# Patient Record
Sex: FEMALE | Race: WHITE | NOT HISPANIC OR LATINO | Employment: FULL TIME | URBAN - METROPOLITAN AREA
[De-identification: names, ages, dates, MRNs, and addresses within clinical notes are randomized per-mention and may not be internally consistent; named-entity substitution may affect disease eponyms.]

---

## 2017-04-11 ENCOUNTER — OFFICE VISIT - HEALTHEAST (OUTPATIENT)
Dept: FAMILY MEDICINE | Facility: CLINIC | Age: 42
End: 2017-04-11

## 2017-04-11 DIAGNOSIS — J98.01 BRONCHOSPASM: ICD-10-CM

## 2017-04-11 DIAGNOSIS — K21.9 GERD (GASTROESOPHAGEAL REFLUX DISEASE): ICD-10-CM

## 2017-04-11 ASSESSMENT — MIFFLIN-ST. JEOR: SCORE: 1258.43

## 2017-09-16 ENCOUNTER — COMMUNICATION - HEALTHEAST (OUTPATIENT)
Dept: FAMILY MEDICINE | Facility: CLINIC | Age: 42
End: 2017-09-16

## 2017-10-14 ENCOUNTER — COMMUNICATION - HEALTHEAST (OUTPATIENT)
Dept: FAMILY MEDICINE | Facility: CLINIC | Age: 42
End: 2017-10-14

## 2017-10-14 DIAGNOSIS — F41.1 GENERALIZED ANXIETY DISORDER: ICD-10-CM

## 2017-10-14 DIAGNOSIS — F33.9 MAJOR DEPRESSIVE DISORDER, RECURRENT EPISODE (H): ICD-10-CM

## 2017-11-13 ENCOUNTER — OFFICE VISIT - HEALTHEAST (OUTPATIENT)
Dept: FAMILY MEDICINE | Facility: CLINIC | Age: 42
End: 2017-11-13

## 2017-11-13 DIAGNOSIS — F41.1 GENERALIZED ANXIETY DISORDER: ICD-10-CM

## 2017-11-13 DIAGNOSIS — F33.9 MAJOR DEPRESSIVE DISORDER, RECURRENT EPISODE (H): ICD-10-CM

## 2017-11-13 DIAGNOSIS — Z13.220 SCREENING FOR CHOLESTEROL LEVEL: ICD-10-CM

## 2017-11-13 ASSESSMENT — MIFFLIN-ST. JEOR: SCORE: 1276.57

## 2017-11-15 ENCOUNTER — COMMUNICATION - HEALTHEAST (OUTPATIENT)
Dept: FAMILY MEDICINE | Facility: CLINIC | Age: 42
End: 2017-11-15

## 2017-11-16 ENCOUNTER — COMMUNICATION - HEALTHEAST (OUTPATIENT)
Dept: FAMILY MEDICINE | Facility: CLINIC | Age: 42
End: 2017-11-16

## 2017-11-16 ENCOUNTER — RECORDS - HEALTHEAST (OUTPATIENT)
Dept: ADMINISTRATIVE | Facility: OTHER | Age: 42
End: 2017-11-16

## 2017-11-20 ENCOUNTER — COMMUNICATION - HEALTHEAST (OUTPATIENT)
Dept: FAMILY MEDICINE | Facility: CLINIC | Age: 42
End: 2017-11-20

## 2017-11-27 ENCOUNTER — COMMUNICATION - HEALTHEAST (OUTPATIENT)
Dept: FAMILY MEDICINE | Facility: CLINIC | Age: 42
End: 2017-11-27

## 2017-12-08 ENCOUNTER — COMMUNICATION - HEALTHEAST (OUTPATIENT)
Dept: FAMILY MEDICINE | Facility: CLINIC | Age: 42
End: 2017-12-08

## 2018-01-31 ENCOUNTER — COMMUNICATION - HEALTHEAST (OUTPATIENT)
Dept: FAMILY MEDICINE | Facility: CLINIC | Age: 43
End: 2018-01-31

## 2018-01-31 ENCOUNTER — COMMUNICATION - HEALTHEAST (OUTPATIENT)
Dept: SCHEDULING | Facility: CLINIC | Age: 43
End: 2018-01-31

## 2018-04-23 ENCOUNTER — COMMUNICATION - HEALTHEAST (OUTPATIENT)
Dept: FAMILY MEDICINE | Facility: CLINIC | Age: 43
End: 2018-04-23

## 2018-04-23 ENCOUNTER — OFFICE VISIT - HEALTHEAST (OUTPATIENT)
Dept: BEHAVIORAL HEALTH | Facility: CLINIC | Age: 43
End: 2018-04-23

## 2018-04-23 DIAGNOSIS — F41.1 GENERALIZED ANXIETY DISORDER: ICD-10-CM

## 2018-04-23 DIAGNOSIS — F33.9 MAJOR DEPRESSIVE DISORDER, RECURRENT EPISODE (H): ICD-10-CM

## 2018-04-23 DIAGNOSIS — F33.1 MAJOR DEPRESSIVE DISORDER, RECURRENT, MODERATE (H): ICD-10-CM

## 2018-05-21 ENCOUNTER — OFFICE VISIT - HEALTHEAST (OUTPATIENT)
Dept: BEHAVIORAL HEALTH | Facility: CLINIC | Age: 43
End: 2018-05-21

## 2018-05-21 DIAGNOSIS — F33.1 MAJOR DEPRESSIVE DISORDER, RECURRENT, MODERATE (H): ICD-10-CM

## 2018-05-21 DIAGNOSIS — F41.1 GENERALIZED ANXIETY DISORDER: ICD-10-CM

## 2018-06-04 ENCOUNTER — OFFICE VISIT - HEALTHEAST (OUTPATIENT)
Dept: BEHAVIORAL HEALTH | Facility: CLINIC | Age: 43
End: 2018-06-04

## 2018-06-04 ENCOUNTER — AMBULATORY - HEALTHEAST (OUTPATIENT)
Dept: BEHAVIORAL HEALTH | Facility: CLINIC | Age: 43
End: 2018-06-04

## 2018-06-04 DIAGNOSIS — F41.1 GENERALIZED ANXIETY DISORDER: ICD-10-CM

## 2018-06-04 DIAGNOSIS — F33.1 MAJOR DEPRESSIVE DISORDER, RECURRENT, MODERATE (H): ICD-10-CM

## 2018-07-25 ENCOUNTER — AMBULATORY - HEALTHEAST (OUTPATIENT)
Dept: BEHAVIORAL HEALTH | Facility: CLINIC | Age: 43
End: 2018-07-25

## 2018-08-01 ENCOUNTER — OFFICE VISIT - HEALTHEAST (OUTPATIENT)
Dept: BEHAVIORAL HEALTH | Facility: CLINIC | Age: 43
End: 2018-08-01

## 2018-08-01 DIAGNOSIS — F41.1 GENERALIZED ANXIETY DISORDER: ICD-10-CM

## 2018-08-01 DIAGNOSIS — F33.1 MAJOR DEPRESSIVE DISORDER, RECURRENT, MODERATE (H): ICD-10-CM

## 2019-02-19 ENCOUNTER — OFFICE VISIT - HEALTHEAST (OUTPATIENT)
Dept: FAMILY MEDICINE | Facility: CLINIC | Age: 44
End: 2019-02-19

## 2019-02-19 DIAGNOSIS — E55.9 VITAMIN D DEFICIENCY: ICD-10-CM

## 2019-02-19 DIAGNOSIS — R76.8 ANTI-TPO ANTIBODIES PRESENT: ICD-10-CM

## 2019-02-19 DIAGNOSIS — F41.1 GENERALIZED ANXIETY DISORDER: ICD-10-CM

## 2019-02-19 DIAGNOSIS — F33.9 MAJOR DEPRESSIVE DISORDER, RECURRENT EPISODE (H): ICD-10-CM

## 2019-02-19 DIAGNOSIS — Z12.31 VISIT FOR SCREENING MAMMOGRAM: ICD-10-CM

## 2019-02-19 DIAGNOSIS — E53.8 VITAMIN B12 DEFICIENCY (NON ANEMIC): ICD-10-CM

## 2019-02-19 LAB
T3 SERPL-MCNC: 75 NG/DL (ref 45–175)
T4 FREE SERPL-MCNC: 0.9 NG/DL (ref 0.7–1.8)
THYROID PEROXIDASE ANTIBODIES - HISTORICAL: 76.2 IU/ML (ref 0–5.6)
TSH SERPL DL<=0.005 MIU/L-ACNC: 2.02 UIU/ML (ref 0.3–5)
VIT B12 SERPL-MCNC: 263 PG/ML (ref 213–816)

## 2019-02-20 LAB — 25(OH)D3 SERPL-MCNC: 10.8 NG/ML (ref 30–80)

## 2019-02-21 ENCOUNTER — COMMUNICATION - HEALTHEAST (OUTPATIENT)
Dept: FAMILY MEDICINE | Facility: CLINIC | Age: 44
End: 2019-02-21

## 2019-03-05 ENCOUNTER — HOSPITAL ENCOUNTER (OUTPATIENT)
Dept: MAMMOGRAPHY | Facility: CLINIC | Age: 44
Discharge: HOME | End: 2019-03-05
Attending: FAMILY MEDICINE

## 2019-03-05 DIAGNOSIS — Z12.31 VISIT FOR SCREENING MAMMOGRAM: ICD-10-CM

## 2019-04-01 ENCOUNTER — AMBULATORY - HEALTHEAST (OUTPATIENT)
Dept: BEHAVIORAL HEALTH | Facility: CLINIC | Age: 44
End: 2019-04-01

## 2019-04-01 ENCOUNTER — OFFICE VISIT - HEALTHEAST (OUTPATIENT)
Dept: BEHAVIORAL HEALTH | Facility: CLINIC | Age: 44
End: 2019-04-01

## 2019-04-01 DIAGNOSIS — F41.1 GENERALIZED ANXIETY DISORDER: ICD-10-CM

## 2019-04-01 DIAGNOSIS — F33.0 MAJOR DEPRESSIVE DISORDER, RECURRENT EPISODE, MILD (H): ICD-10-CM

## 2019-08-21 ENCOUNTER — OFFICE VISIT - HEALTHEAST (OUTPATIENT)
Dept: FAMILY MEDICINE | Facility: CLINIC | Age: 44
End: 2019-08-21

## 2019-08-21 DIAGNOSIS — E53.8 VITAMIN B12 DEFICIENCY (NON ANEMIC): ICD-10-CM

## 2019-08-21 DIAGNOSIS — R42 VERTIGO: ICD-10-CM

## 2019-08-21 DIAGNOSIS — Z13.1 SCREENING FOR DIABETES MELLITUS: ICD-10-CM

## 2019-08-21 DIAGNOSIS — Z13.220 SCREENING FOR CHOLESTEROL LEVEL: ICD-10-CM

## 2019-08-21 DIAGNOSIS — Z12.4 SCREENING FOR CERVICAL CANCER: ICD-10-CM

## 2019-08-21 DIAGNOSIS — R42 LIGHTHEADEDNESS: ICD-10-CM

## 2019-08-21 DIAGNOSIS — Z00.00 ROUTINE GENERAL MEDICAL EXAMINATION AT A HEALTH CARE FACILITY: ICD-10-CM

## 2019-08-21 DIAGNOSIS — H91.93 BILATERAL HEARING LOSS, UNSPECIFIED HEARING LOSS TYPE: ICD-10-CM

## 2019-08-21 DIAGNOSIS — E55.9 VITAMIN D DEFICIENCY: ICD-10-CM

## 2019-08-21 LAB
ALBUMIN SERPL-MCNC: 3.9 G/DL (ref 3.5–5)
ALP SERPL-CCNC: 113 U/L (ref 45–120)
ALT SERPL W P-5'-P-CCNC: 22 U/L (ref 0–45)
ANION GAP SERPL CALCULATED.3IONS-SCNC: 7 MMOL/L (ref 5–18)
AST SERPL W P-5'-P-CCNC: 18 U/L (ref 0–40)
BILIRUB SERPL-MCNC: 0.5 MG/DL (ref 0–1)
BUN SERPL-MCNC: 16 MG/DL (ref 8–22)
CALCIUM SERPL-MCNC: 9.1 MG/DL (ref 8.5–10.5)
CHLORIDE BLD-SCNC: 111 MMOL/L (ref 98–107)
CHOLEST SERPL-MCNC: 184 MG/DL
CO2 SERPL-SCNC: 19 MMOL/L (ref 22–31)
CREAT SERPL-MCNC: 0.88 MG/DL (ref 0.6–1.1)
ERYTHROCYTE [DISTWIDTH] IN BLOOD BY AUTOMATED COUNT: 14.5 % (ref 11–14.5)
FASTING STATUS PATIENT QL REPORTED: YES
GFR SERPL CREATININE-BSD FRML MDRD: >60 ML/MIN/1.73M2
GLUCOSE BLD-MCNC: 88 MG/DL (ref 70–125)
HCT VFR BLD AUTO: 38.7 % (ref 35–47)
HDLC SERPL-MCNC: 46 MG/DL
HGB BLD-MCNC: 12.7 G/DL (ref 12–16)
LDLC SERPL CALC-MCNC: 121 MG/DL
MCH RBC QN AUTO: 28.5 PG (ref 27–34)
MCHC RBC AUTO-ENTMCNC: 32.8 G/DL (ref 32–36)
MCV RBC AUTO: 87 FL (ref 80–100)
PLATELET # BLD AUTO: 224 THOU/UL (ref 140–440)
PMV BLD AUTO: 11.4 FL (ref 8.5–12.5)
POTASSIUM BLD-SCNC: 3.6 MMOL/L (ref 3.5–5)
PROT SERPL-MCNC: 8 G/DL (ref 6–8)
RBC # BLD AUTO: 4.46 MILL/UL (ref 3.8–5.4)
SODIUM SERPL-SCNC: 137 MMOL/L (ref 136–145)
TRIGL SERPL-MCNC: 87 MG/DL
WBC: 6.1 THOU/UL (ref 4–11)

## 2019-08-21 ASSESSMENT — MIFFLIN-ST. JEOR: SCORE: 1303.79

## 2019-08-22 ENCOUNTER — AMBULATORY - HEALTHEAST (OUTPATIENT)
Dept: FAMILY MEDICINE | Facility: CLINIC | Age: 44
End: 2019-08-22

## 2019-08-22 DIAGNOSIS — E55.9 VITAMIN D DEFICIENCY: ICD-10-CM

## 2019-08-22 LAB
25(OH)D3 SERPL-MCNC: 17.3 NG/ML (ref 30–80)
25(OH)D3 SERPL-MCNC: 17.3 NG/ML (ref 30–80)
HPV SOURCE: NORMAL
HUMAN PAPILLOMA VIRUS 16 DNA: NEGATIVE
HUMAN PAPILLOMA VIRUS 18 DNA: NEGATIVE
HUMAN PAPILLOMA VIRUS FINAL DIAGNOSIS: NORMAL
HUMAN PAPILLOMA VIRUS OTHER HR: NEGATIVE
SPECIMEN DESCRIPTION: NORMAL

## 2019-08-28 LAB
BKR LAB AP ABNORMAL BLEEDING: NORMAL
BKR LAB AP BIRTH CONTROL/HORMONES: NORMAL
BKR LAB AP CERVICAL APPEARANCE: NORMAL
BKR LAB AP GYN ADEQUACY: NORMAL
BKR LAB AP GYN INTERPRETATION: NORMAL
BKR LAB AP HPV REFLEX: NORMAL
BKR LAB AP LMP: NORMAL
BKR LAB AP PATIENT STATUS: NORMAL
BKR LAB AP PREVIOUS ABNORMAL: NORMAL
BKR LAB AP PREVIOUS NORMAL: NORMAL
HIGH RISK?: NO
PATH REPORT.COMMENTS IMP SPEC: NORMAL
RESULT FLAG (HE HISTORICAL CONVERSION): NORMAL

## 2019-09-11 ENCOUNTER — OFFICE VISIT - HEALTHEAST (OUTPATIENT)
Dept: OTOLARYNGOLOGY | Facility: CLINIC | Age: 44
End: 2019-09-11

## 2019-09-11 ENCOUNTER — OFFICE VISIT - HEALTHEAST (OUTPATIENT)
Dept: AUDIOLOGY | Facility: CLINIC | Age: 44
End: 2019-09-11

## 2019-09-11 DIAGNOSIS — H93.8X3 PLUGGED FEELING IN EAR, BILATERAL: ICD-10-CM

## 2019-09-11 DIAGNOSIS — R42 DIZZINESS: ICD-10-CM

## 2019-09-11 DIAGNOSIS — R42 DIZZINESS AND GIDDINESS: ICD-10-CM

## 2019-09-13 ENCOUNTER — COMMUNICATION - HEALTHEAST (OUTPATIENT)
Dept: OTOLARYNGOLOGY | Facility: CLINIC | Age: 44
End: 2019-09-13

## 2019-10-14 ENCOUNTER — AMBULATORY - HEALTHEAST (OUTPATIENT)
Dept: BEHAVIORAL HEALTH | Facility: CLINIC | Age: 44
End: 2019-10-14

## 2019-10-30 ENCOUNTER — OFFICE VISIT - HEALTHEAST (OUTPATIENT)
Dept: OTOLARYNGOLOGY | Facility: CLINIC | Age: 44
End: 2019-10-30

## 2019-10-30 DIAGNOSIS — R42 DIZZINESS: ICD-10-CM

## 2020-02-14 ENCOUNTER — OFFICE VISIT - HEALTHEAST (OUTPATIENT)
Dept: FAMILY MEDICINE | Facility: CLINIC | Age: 45
End: 2020-02-14

## 2020-02-14 DIAGNOSIS — F33.9 MAJOR DEPRESSIVE DISORDER, RECURRENT EPISODE (H): ICD-10-CM

## 2020-02-14 DIAGNOSIS — F41.1 GENERALIZED ANXIETY DISORDER: ICD-10-CM

## 2020-02-14 ASSESSMENT — PATIENT HEALTH QUESTIONNAIRE - PHQ9: SUM OF ALL RESPONSES TO PHQ QUESTIONS 1-9: 3

## 2020-02-14 ASSESSMENT — ANXIETY QUESTIONNAIRES
2. NOT BEING ABLE TO STOP OR CONTROL WORRYING: NOT AT ALL
IF YOU CHECKED OFF ANY PROBLEMS ON THIS QUESTIONNAIRE, HOW DIFFICULT HAVE THESE PROBLEMS MADE IT FOR YOU TO DO YOUR WORK, TAKE CARE OF THINGS AT HOME, OR GET ALONG WITH OTHER PEOPLE: SOMEWHAT DIFFICULT
1. FEELING NERVOUS, ANXIOUS, OR ON EDGE: SEVERAL DAYS
GAD7 TOTAL SCORE: 2
7. FEELING AFRAID AS IF SOMETHING AWFUL MIGHT HAPPEN: NOT AT ALL
4. TROUBLE RELAXING: NOT AT ALL
5. BEING SO RESTLESS THAT IT IS HARD TO SIT STILL: NOT AT ALL
6. BECOMING EASILY ANNOYED OR IRRITABLE: NOT AT ALL
3. WORRYING TOO MUCH ABOUT DIFFERENT THINGS: SEVERAL DAYS

## 2020-04-07 ENCOUNTER — COMMUNICATION - HEALTHEAST (OUTPATIENT)
Dept: FAMILY MEDICINE | Facility: CLINIC | Age: 45
End: 2020-04-07

## 2020-10-12 ENCOUNTER — OFFICE VISIT - HEALTHEAST (OUTPATIENT)
Dept: FAMILY MEDICINE | Facility: CLINIC | Age: 45
End: 2020-10-12

## 2020-10-12 DIAGNOSIS — M62.81 GENERALIZED MUSCLE WEAKNESS: ICD-10-CM

## 2020-10-14 ENCOUNTER — COMMUNICATION - HEALTHEAST (OUTPATIENT)
Dept: SCHEDULING | Facility: CLINIC | Age: 45
End: 2020-10-14

## 2020-10-19 ENCOUNTER — AMBULATORY - HEALTHEAST (OUTPATIENT)
Dept: FAMILY MEDICINE | Facility: CLINIC | Age: 45
End: 2020-10-19

## 2020-10-19 DIAGNOSIS — R76.8 ELEVATED ANTINUCLEAR ANTIBODY (ANA) LEVEL: ICD-10-CM

## 2020-10-20 ENCOUNTER — OFFICE VISIT - HEALTHEAST (OUTPATIENT)
Dept: FAMILY MEDICINE | Facility: CLINIC | Age: 45
End: 2020-10-20

## 2020-10-20 ENCOUNTER — COMMUNICATION - HEALTHEAST (OUTPATIENT)
Dept: FAMILY MEDICINE | Facility: CLINIC | Age: 45
End: 2020-10-20

## 2020-10-20 DIAGNOSIS — M62.81 GENERALIZED MUSCLE WEAKNESS: ICD-10-CM

## 2020-10-20 DIAGNOSIS — Z86.39 HISTORY OF HYPOKALEMIA: ICD-10-CM

## 2020-10-20 DIAGNOSIS — R74.8 ELEVATED CK: ICD-10-CM

## 2020-10-20 DIAGNOSIS — R76.8 ANA POSITIVE: ICD-10-CM

## 2020-10-20 LAB
ANION GAP SERPL CALCULATED.3IONS-SCNC: 7 MMOL/L (ref 5–18)
BUN SERPL-MCNC: 16 MG/DL (ref 8–22)
CALCIUM SERPL-MCNC: 9.4 MG/DL (ref 8.5–10.5)
CHLORIDE BLD-SCNC: 108 MMOL/L (ref 98–107)
CK SERPL-CCNC: 26 U/L (ref 30–190)
CO2 SERPL-SCNC: 22 MMOL/L (ref 22–31)
CREAT SERPL-MCNC: 0.83 MG/DL (ref 0.6–1.1)
GFR SERPL CREATININE-BSD FRML MDRD: >60 ML/MIN/1.73M2
GLUCOSE BLD-MCNC: 81 MG/DL (ref 70–125)
POTASSIUM BLD-SCNC: 4.6 MMOL/L (ref 3.5–5)
SODIUM SERPL-SCNC: 137 MMOL/L (ref 136–145)

## 2020-10-20 ASSESSMENT — PATIENT HEALTH QUESTIONNAIRE - PHQ9: SUM OF ALL RESPONSES TO PHQ QUESTIONS 1-9: 6

## 2020-11-10 ENCOUNTER — COMMUNICATION - HEALTHEAST (OUTPATIENT)
Dept: FAMILY MEDICINE | Facility: CLINIC | Age: 45
End: 2020-11-10

## 2020-11-19 ENCOUNTER — COMMUNICATION - HEALTHEAST (OUTPATIENT)
Dept: FAMILY MEDICINE | Facility: CLINIC | Age: 45
End: 2020-11-19

## 2020-11-19 DIAGNOSIS — E87.6 HYPOKALEMIA: ICD-10-CM

## 2020-11-24 ENCOUNTER — OFFICE VISIT - HEALTHEAST (OUTPATIENT)
Dept: RHEUMATOLOGY | Facility: CLINIC | Age: 45
End: 2020-11-24

## 2020-11-24 ENCOUNTER — COMMUNICATION - HEALTHEAST (OUTPATIENT)
Dept: FAMILY MEDICINE | Facility: CLINIC | Age: 45
End: 2020-11-24

## 2020-11-24 DIAGNOSIS — M35.04 SJOGREN'S SYNDROME WITH TUBULO-INTERSTITIAL NEPHROPATHY (H): ICD-10-CM

## 2020-11-24 DIAGNOSIS — N25.89 TYPE II RTA: ICD-10-CM

## 2020-11-25 ENCOUNTER — COMMUNICATION - HEALTHEAST (OUTPATIENT)
Dept: RHEUMATOLOGY | Facility: CLINIC | Age: 45
End: 2020-11-25

## 2020-12-01 ENCOUNTER — COMMUNICATION - HEALTHEAST (OUTPATIENT)
Dept: RHEUMATOLOGY | Facility: CLINIC | Age: 45
End: 2020-12-01

## 2020-12-02 ENCOUNTER — COMMUNICATION - HEALTHEAST (OUTPATIENT)
Dept: ADMINISTRATIVE | Facility: CLINIC | Age: 45
End: 2020-12-02

## 2020-12-07 LAB
CREATININE (EXTERNAL): 0.92 MG/DL (ref 0.55–1.02)
GFR ESTIMATED (EXTERNAL): >60 ML/MIN
GFR ESTIMATED (IF AFRICAN AMERICAN) (EXTERNAL): >60 ML/MIN
GLUCOSE (EXTERNAL): 81 MG/DL (ref 74–106)
POTASSIUM (EXTERNAL): 4 MMOL/L (ref 3.5–5.1)

## 2020-12-09 ENCOUNTER — COMMUNICATION - HEALTHEAST (OUTPATIENT)
Dept: RHEUMATOLOGY | Facility: CLINIC | Age: 45
End: 2020-12-09

## 2020-12-15 ENCOUNTER — AMBULATORY - HEALTHEAST (OUTPATIENT)
Dept: LAB | Facility: CLINIC | Age: 45
End: 2020-12-15

## 2020-12-15 DIAGNOSIS — M35.04 SJOGREN'S SYNDROME WITH TUBULO-INTERSTITIAL NEPHROPATHY (H): ICD-10-CM

## 2020-12-15 DIAGNOSIS — N25.89 TYPE II RTA: ICD-10-CM

## 2020-12-15 LAB
ALBUMIN SERPL-MCNC: 4 G/DL (ref 3.5–5)
ALBUMIN UR-MCNC: ABNORMAL MG/DL
ALP SERPL-CCNC: 110 U/L (ref 45–120)
ALT SERPL W P-5'-P-CCNC: 17 U/L (ref 0–45)
ANION GAP SERPL CALCULATED.3IONS-SCNC: 8 MMOL/L (ref 5–18)
APPEARANCE UR: CLEAR
AST SERPL W P-5'-P-CCNC: 15 U/L (ref 0–40)
BACTERIA #/AREA URNS HPF: ABNORMAL HPF
BASOPHILS # BLD AUTO: 0 THOU/UL (ref 0–0.2)
BASOPHILS NFR BLD AUTO: 0 % (ref 0–2)
BILIRUB SERPL-MCNC: 0.3 MG/DL (ref 0–1)
BILIRUB UR QL STRIP: NEGATIVE
BUN SERPL-MCNC: 16 MG/DL (ref 8–22)
C REACTIVE PROTEIN LHE: 0.2 MG/DL (ref 0–0.8)
C3 SERPL-MCNC: 127 MG/DL (ref 83–177)
C4 SERPL-MCNC: 21 MG/DL (ref 19–59)
CALCIUM SERPL-MCNC: 9.1 MG/DL (ref 8.5–10.5)
CCP AB SER IA-ACNC: <0.5 U/ML
CHLORIDE BLD-SCNC: 113 MMOL/L (ref 98–107)
CK SERPL-CCNC: 27 U/L (ref 30–190)
CO2 SERPL-SCNC: 19 MMOL/L (ref 22–31)
COLOR UR AUTO: YELLOW
CREAT SERPL-MCNC: 0.91 MG/DL (ref 0.6–1.1)
CREAT UR-MCNC: 118.1 MG/DL
EOSINOPHIL # BLD AUTO: 0.1 THOU/UL (ref 0–0.4)
EOSINOPHIL NFR BLD AUTO: 1 % (ref 0–6)
ERYTHROCYTE [DISTWIDTH] IN BLOOD BY AUTOMATED COUNT: 15 % (ref 11–14.5)
ERYTHROCYTE [SEDIMENTATION RATE] IN BLOOD BY WESTERGREN METHOD: 36 MM/HR (ref 0–20)
GFR SERPL CREATININE-BSD FRML MDRD: >60 ML/MIN/1.73M2
GLUCOSE BLD-MCNC: 82 MG/DL (ref 70–125)
GLUCOSE UR STRIP-MCNC: NEGATIVE MG/DL
HCT VFR BLD AUTO: 39.9 % (ref 35–47)
HGB BLD-MCNC: 12.7 G/DL (ref 12–16)
HGB UR QL STRIP: NEGATIVE
IMM GRANULOCYTES # BLD: 0 THOU/UL
IMM GRANULOCYTES NFR BLD: 0 %
KETONES UR STRIP-MCNC: NEGATIVE MG/DL
LEUKOCYTE ESTERASE UR QL STRIP: NEGATIVE
LYMPHOCYTES # BLD AUTO: 1.4 THOU/UL (ref 0.8–4.4)
LYMPHOCYTES NFR BLD AUTO: 26 % (ref 20–40)
MCH RBC QN AUTO: 27.8 PG (ref 27–34)
MCHC RBC AUTO-ENTMCNC: 31.8 G/DL (ref 32–36)
MCV RBC AUTO: 87 FL (ref 80–100)
MICROALBUMIN UR-MCNC: 21.35 MG/DL (ref 0–1.99)
MICROALBUMIN/CREAT UR: 180.8 MG/G
MONOCYTES # BLD AUTO: 0.5 THOU/UL (ref 0–0.9)
MONOCYTES NFR BLD AUTO: 9 % (ref 2–10)
NEUTROPHILS # BLD AUTO: 3.6 THOU/UL (ref 2–7.7)
NEUTROPHILS NFR BLD AUTO: 64 % (ref 50–70)
NITRATE UR QL: NEGATIVE
PH UR STRIP: 7 [PH] (ref 5–8)
PLATELET # BLD AUTO: 194 THOU/UL (ref 140–440)
PMV BLD AUTO: 12.1 FL (ref 8.5–12.5)
POTASSIUM BLD-SCNC: 3.9 MMOL/L (ref 3.5–5)
PROT SERPL-MCNC: 8.3 G/DL (ref 6–8)
RBC # BLD AUTO: 4.57 MILL/UL (ref 3.8–5.4)
RBC #/AREA URNS AUTO: ABNORMAL HPF
SODIUM SERPL-SCNC: 140 MMOL/L (ref 136–145)
SP GR UR STRIP: 1.02 (ref 1–1.03)
SQUAMOUS #/AREA URNS AUTO: ABNORMAL LPF
UROBILINOGEN UR STRIP-ACNC: ABNORMAL
WBC #/AREA URNS AUTO: ABNORMAL HPF
WBC: 5.7 THOU/UL (ref 4–11)

## 2020-12-16 LAB
HBV SURFACE AG SERPL QL IA: NEGATIVE
HCV AB SERPL QL IA: NEGATIVE

## 2020-12-17 LAB
CARDIOLIPIN IGA SER IA-ACNC: 20.1 APL U/ML (ref 0–19.9)
CARDIOLIPIN IGG SER IA-ACNC: 3.4 GPL-U/ML (ref 0–19.9)
CARDIOLIPIN IGM SER IA-ACNC: 19.7 MPL-U/ML (ref 0–19.9)
TPMT BLD-CCNC: 26.3 U/ML (ref 24–44)

## 2020-12-18 LAB
GAMMA INTERFERON BACKGROUND BLD IA-ACNC: 0.04 IU/ML
LA PPP-IMP: NEGATIVE
M TB IFN-G BLD-IMP: NEGATIVE
MITOGEN IGNF BCKGRD COR BLD-ACNC: -0.01 IU/ML
MITOGEN IGNF BCKGRD COR BLD-ACNC: 0 IU/ML
QTF INTERPRETATION: NORMAL
QTF MITOGEN - NIL: >10 IU/ML

## 2020-12-28 ENCOUNTER — COMMUNICATION - HEALTHEAST (OUTPATIENT)
Dept: RHEUMATOLOGY | Facility: CLINIC | Age: 45
End: 2020-12-28

## 2020-12-28 DIAGNOSIS — N25.89 TYPE II RTA: ICD-10-CM

## 2021-01-05 ENCOUNTER — OFFICE VISIT - HEALTHEAST (OUTPATIENT)
Dept: RHEUMATOLOGY | Facility: CLINIC | Age: 46
End: 2021-01-05

## 2021-01-05 ENCOUNTER — COMMUNICATION - HEALTHEAST (OUTPATIENT)
Dept: RHEUMATOLOGY | Facility: CLINIC | Age: 46
End: 2021-01-05

## 2021-01-05 DIAGNOSIS — M35.04 SJOGREN'S SYNDROME WITH TUBULO-INTERSTITIAL NEPHROPATHY (H): ICD-10-CM

## 2021-01-05 DIAGNOSIS — N25.89 TYPE II RTA: ICD-10-CM

## 2021-01-05 RX ORDER — CAFFEINE 200 MG
200 TABLET ORAL
Status: SHIPPED | COMMUNITY
Start: 2021-01-05

## 2021-01-11 ENCOUNTER — OFFICE VISIT - HEALTHEAST (OUTPATIENT)
Dept: RHEUMATOLOGY | Facility: CLINIC | Age: 46
End: 2021-01-11

## 2021-01-11 DIAGNOSIS — N25.89 TYPE II RTA: ICD-10-CM

## 2021-01-11 DIAGNOSIS — M35.04 SJOGREN'S SYNDROME WITH TUBULO-INTERSTITIAL NEPHROPATHY (H): ICD-10-CM

## 2021-01-11 DIAGNOSIS — R76.0 ANTICARDIOLIPIN ANTIBODY POSITIVE: ICD-10-CM

## 2021-01-11 DIAGNOSIS — M19.90 INFLAMMATORY ARTHRITIS: ICD-10-CM

## 2021-01-11 DIAGNOSIS — M79.642 PAIN IN BOTH HANDS: ICD-10-CM

## 2021-01-11 DIAGNOSIS — R77.8 ELEVATED TOTAL PROTEIN: ICD-10-CM

## 2021-01-11 DIAGNOSIS — M25.551 RIGHT HIP PAIN: ICD-10-CM

## 2021-01-11 DIAGNOSIS — M79.641 PAIN IN BOTH HANDS: ICD-10-CM

## 2021-01-11 ASSESSMENT — MIFFLIN-ST. JEOR: SCORE: 1245.5

## 2021-01-20 ENCOUNTER — COMMUNICATION - HEALTHEAST (OUTPATIENT)
Dept: FAMILY MEDICINE | Facility: CLINIC | Age: 46
End: 2021-01-20

## 2021-01-20 DIAGNOSIS — E87.6 HYPOKALEMIA: ICD-10-CM

## 2021-02-02 ENCOUNTER — COMMUNICATION - HEALTHEAST (OUTPATIENT)
Dept: RHEUMATOLOGY | Facility: CLINIC | Age: 46
End: 2021-02-02

## 2021-02-16 ENCOUNTER — AMBULATORY - HEALTHEAST (OUTPATIENT)
Dept: LAB | Facility: CLINIC | Age: 46
End: 2021-02-16

## 2021-02-16 DIAGNOSIS — R76.0 ANTICARDIOLIPIN ANTIBODY POSITIVE: ICD-10-CM

## 2021-02-16 DIAGNOSIS — M79.642 PAIN IN BOTH HANDS: ICD-10-CM

## 2021-02-16 DIAGNOSIS — N25.89 TYPE II RTA: ICD-10-CM

## 2021-02-16 DIAGNOSIS — M79.641 PAIN IN BOTH HANDS: ICD-10-CM

## 2021-02-16 DIAGNOSIS — R77.8 ELEVATED TOTAL PROTEIN: ICD-10-CM

## 2021-02-16 DIAGNOSIS — M35.04 SJOGREN'S SYNDROME WITH TUBULO-INTERSTITIAL NEPHROPATHY (H): ICD-10-CM

## 2021-02-16 LAB
ALBUMIN UR-MCNC: ABNORMAL MG/DL
ANION GAP SERPL CALCULATED.3IONS-SCNC: 7 MMOL/L (ref 5–18)
APPEARANCE UR: CLEAR
BACTERIA #/AREA URNS HPF: ABNORMAL HPF
BILIRUB UR QL STRIP: NEGATIVE
BUN SERPL-MCNC: 17 MG/DL (ref 8–22)
CALCIUM SERPL-MCNC: 8.7 MG/DL (ref 8.5–10.5)
CHLORIDE BLD-SCNC: 106 MMOL/L (ref 98–107)
CO2 SERPL-SCNC: 25 MMOL/L (ref 22–31)
COLOR UR AUTO: YELLOW
CREAT SERPL-MCNC: 0.8 MG/DL (ref 0.6–1.1)
CREAT UR-MCNC: 97.8 MG/DL
GFR SERPL CREATININE-BSD FRML MDRD: >60 ML/MIN/1.73M2
GLUCOSE BLD-MCNC: 78 MG/DL (ref 70–125)
GLUCOSE UR STRIP-MCNC: NEGATIVE MG/DL
HGB UR QL STRIP: NEGATIVE
KETONES UR STRIP-MCNC: NEGATIVE MG/DL
LEUKOCYTE ESTERASE UR QL STRIP: NEGATIVE
MICROALBUMIN UR-MCNC: 7.06 MG/DL (ref 0–1.99)
MICROALBUMIN/CREAT UR: 72.2 MG/G
NITRATE UR QL: NEGATIVE
PH UR STRIP: 7.5 [PH] (ref 5–8)
POTASSIUM BLD-SCNC: 4.5 MMOL/L (ref 3.5–5)
RBC #/AREA URNS AUTO: ABNORMAL HPF
SODIUM SERPL-SCNC: 138 MMOL/L (ref 136–145)
SP GR UR STRIP: 1.02 (ref 1–1.03)
SQUAMOUS #/AREA URNS AUTO: ABNORMAL LPF
UROBILINOGEN UR STRIP-ACNC: ABNORMAL
WBC #/AREA URNS AUTO: ABNORMAL HPF

## 2021-02-17 LAB
ALBUMIN PERCENT: 55 % (ref 51–67)
ALBUMIN SERPL ELPH-MCNC: 4.3 G/DL (ref 3.2–4.7)
ALPHA 1 PERCENT: 2.7 % (ref 2–4)
ALPHA 2 PERCENT: 10 % (ref 5–13)
ALPHA1 GLOB SERPL ELPH-MCNC: 0.2 G/DL (ref 0.1–0.3)
ALPHA2 GLOB SERPL ELPH-MCNC: 0.8 G/DL (ref 0.4–0.9)
B-GLOBULIN SERPL ELPH-MCNC: 0.9 G/DL (ref 0.7–1.2)
BETA PERCENT: 11 % (ref 10–17)
CARDIOLIPIN IGA SER IA-ACNC: 20.8 APL U/ML (ref 0–19.9)
CARDIOLIPIN IGG SER IA-ACNC: 2.8 GPL-U/ML (ref 0–19.9)
CARDIOLIPIN IGM SER IA-ACNC: 15.3 MPL-U/ML (ref 0–19.9)
GAMMA GLOB SERPL ELPH-MCNC: 1.7 G/DL (ref 0.6–1.4)
GAMMA GLOBULIN PERCENT: 21.3 % (ref 9–20)
PATH ICD:: ABNORMAL
PROT PATTERN SERPL ELPH-IMP: ABNORMAL
PROT SERPL-MCNC: 7.9 G/DL (ref 6–8)
REVIEWING PATHOLOGIST: ABNORMAL

## 2021-02-19 LAB
PATH ICD:: NORMAL
PROT PATTERN SERPL ELPH-IMP: NORMAL
REVIEWING PATHOLOGIST: NORMAL
TOTAL PROTEIN RANDOM URINE MG/DL: 30 MG/DL

## 2021-02-28 ENCOUNTER — COMMUNICATION - HEALTHEAST (OUTPATIENT)
Dept: FAMILY MEDICINE | Facility: CLINIC | Age: 46
End: 2021-02-28

## 2021-02-28 DIAGNOSIS — E87.6 HYPOKALEMIA: ICD-10-CM

## 2021-03-01 LAB
CREATININE (EXTERNAL): 0.78 MG/DL (ref 0.55–1.02)
GFR ESTIMATED (EXTERNAL): >60 ML/MIN
GFR ESTIMATED (IF AFRICAN AMERICAN) (EXTERNAL): >60 ML/MIN
GLUCOSE (EXTERNAL): 82 MG/DL (ref 74–106)
POTASSIUM (EXTERNAL): 4.5 MMOL/L (ref 3.5–5.1)

## 2021-03-09 ENCOUNTER — COMMUNICATION - HEALTHEAST (OUTPATIENT)
Dept: RHEUMATOLOGY | Facility: CLINIC | Age: 46
End: 2021-03-09

## 2021-03-09 DIAGNOSIS — R76.0 ANTICARDIOLIPIN ANTIBODY POSITIVE: ICD-10-CM

## 2021-03-17 ENCOUNTER — COMMUNICATION - HEALTHEAST (OUTPATIENT)
Dept: RHEUMATOLOGY | Facility: CLINIC | Age: 46
End: 2021-03-17

## 2021-03-17 DIAGNOSIS — R76.0 ANTICARDIOLIPIN ANTIBODY POSITIVE: ICD-10-CM

## 2021-03-17 DIAGNOSIS — M19.90 INFLAMMATORY ARTHRITIS: ICD-10-CM

## 2021-03-25 ENCOUNTER — COMMUNICATION - HEALTHEAST (OUTPATIENT)
Dept: RHEUMATOLOGY | Facility: CLINIC | Age: 46
End: 2021-03-25

## 2021-03-25 ENCOUNTER — AMBULATORY - HEALTHEAST (OUTPATIENT)
Dept: LAB | Facility: CLINIC | Age: 46
End: 2021-03-25

## 2021-03-25 DIAGNOSIS — M35.04 SJOGREN'S SYNDROME WITH TUBULO-INTERSTITIAL NEPHROPATHY (H): ICD-10-CM

## 2021-03-25 DIAGNOSIS — N25.89 TYPE II RTA: ICD-10-CM

## 2021-03-25 DIAGNOSIS — R76.0 ANTICARDIOLIPIN ANTIBODY POSITIVE: ICD-10-CM

## 2021-03-25 DIAGNOSIS — M19.90 INFLAMMATORY ARTHRITIS: ICD-10-CM

## 2021-03-25 DIAGNOSIS — R79.89 ELEVATED LFTS: ICD-10-CM

## 2021-03-25 LAB
ALBUMIN SERPL-MCNC: 4 G/DL (ref 3.5–5)
ALT SERPL W P-5'-P-CCNC: 481 U/L (ref 0–45)
AST SERPL W P-5'-P-CCNC: 695 U/L (ref 0–40)
BASOPHILS # BLD AUTO: 0 THOU/UL (ref 0–0.2)
BASOPHILS NFR BLD AUTO: 0 % (ref 0–2)
CREAT SERPL-MCNC: 0.8 MG/DL (ref 0.6–1.1)
EOSINOPHIL # BLD AUTO: 0 THOU/UL (ref 0–0.4)
EOSINOPHIL NFR BLD AUTO: 1 % (ref 0–6)
ERYTHROCYTE [DISTWIDTH] IN BLOOD BY AUTOMATED COUNT: 15 % (ref 11–14.5)
GFR SERPL CREATININE-BSD FRML MDRD: >60 ML/MIN/1.73M2
HCT VFR BLD AUTO: 42.1 % (ref 35–47)
HGB BLD-MCNC: 13.8 G/DL (ref 12–16)
IMM GRANULOCYTES # BLD: 0 THOU/UL
IMM GRANULOCYTES NFR BLD: 0 %
LYMPHOCYTES # BLD AUTO: 0.5 THOU/UL (ref 0.8–4.4)
LYMPHOCYTES NFR BLD AUTO: 11 % (ref 20–40)
MCH RBC QN AUTO: 28.5 PG (ref 27–34)
MCHC RBC AUTO-ENTMCNC: 32.8 G/DL (ref 32–36)
MCV RBC AUTO: 87 FL (ref 80–100)
MONOCYTES # BLD AUTO: 0.4 THOU/UL (ref 0–0.9)
MONOCYTES NFR BLD AUTO: 10 % (ref 2–10)
NEUTROPHILS # BLD AUTO: 3.4 THOU/UL (ref 2–7.7)
NEUTROPHILS NFR BLD AUTO: 79 % (ref 50–70)
PLATELET # BLD AUTO: 214 THOU/UL (ref 140–440)
PMV BLD AUTO: 11.4 FL (ref 7–10)
RBC # BLD AUTO: 4.85 MILL/UL (ref 3.8–5.4)
WBC: 4.3 THOU/UL (ref 4–11)

## 2021-03-28 ENCOUNTER — COMMUNICATION - HEALTHEAST (OUTPATIENT)
Dept: RHEUMATOLOGY | Facility: CLINIC | Age: 46
End: 2021-03-28

## 2021-03-29 ENCOUNTER — TRANSCRIBE ORDERS (OUTPATIENT)
Dept: RHEUMATOLOGY | Facility: CLINIC | Age: 46
End: 2021-03-29

## 2021-03-29 DIAGNOSIS — R79.89 ELEVATED LFTS: Primary | ICD-10-CM

## 2021-03-29 DIAGNOSIS — R76.0 ANTICARDIOLIPIN ANTIBODY POSITIVE: ICD-10-CM

## 2021-03-29 DIAGNOSIS — N25.89 TYPE II RTA: ICD-10-CM

## 2021-03-29 LAB
CARDIOLIPIN IGA SER IA-ACNC: 25.5 APL U/ML (ref 0–19.9)
CARDIOLIPIN IGG SER IA-ACNC: 4.8 GPL-U/ML (ref 0–19.9)
CARDIOLIPIN IGM SER IA-ACNC: 27.9 MPL-U/ML (ref 0–19.9)

## 2021-03-31 ENCOUNTER — COMMUNICATION - HEALTHEAST (OUTPATIENT)
Dept: FAMILY MEDICINE | Facility: CLINIC | Age: 46
End: 2021-03-31

## 2021-03-31 DIAGNOSIS — F33.9 EPISODE OF RECURRENT MAJOR DEPRESSIVE DISORDER, UNSPECIFIED DEPRESSION EPISODE SEVERITY (H): ICD-10-CM

## 2021-04-01 ENCOUNTER — AMBULATORY - HEALTHEAST (OUTPATIENT)
Dept: LAB | Facility: CLINIC | Age: 46
End: 2021-04-01

## 2021-04-01 DIAGNOSIS — N25.89 TYPE II RTA: ICD-10-CM

## 2021-04-01 DIAGNOSIS — R79.89 ELEVATED LFTS: ICD-10-CM

## 2021-04-01 DIAGNOSIS — M19.90 INFLAMMATORY ARTHRITIS: ICD-10-CM

## 2021-04-01 DIAGNOSIS — R76.0 ANTICARDIOLIPIN ANTIBODY POSITIVE: ICD-10-CM

## 2021-04-01 LAB
ALBUMIN SERPL-MCNC: 3.8 G/DL (ref 3.5–5)
ALBUMIN SERPL-MCNC: 3.8 G/DL (ref 3.5–5)
ALP SERPL-CCNC: 148 U/L (ref 45–120)
ALP SERPL-CCNC: 148 U/L (ref 45–120)
ALT SERPL W P-5'-P-CCNC: 47 U/L (ref 0–45)
ALT SERPL W P-5'-P-CCNC: 47 U/L (ref 0–45)
ANION GAP SERPL CALCULATED.3IONS-SCNC: 8 MMOL/L (ref 5–18)
AST SERPL W P-5'-P-CCNC: 17 U/L (ref 0–40)
AST SERPL W P-5'-P-CCNC: 17 U/L (ref 0–40)
BILIRUB DIRECT SERPL-MCNC: 0.2 MG/DL
BILIRUB SERPL-MCNC: 0.5 MG/DL (ref 0–1)
BILIRUB SERPL-MCNC: 0.5 MG/DL (ref 0–1)
BUN SERPL-MCNC: 19 MG/DL (ref 8–22)
CALCIUM SERPL-MCNC: 9.1 MG/DL (ref 8.5–10.5)
CHLORIDE BLD-SCNC: 111 MMOL/L (ref 98–107)
CK SERPL-CCNC: 35 U/L (ref 30–190)
CO2 SERPL-SCNC: 22 MMOL/L (ref 22–31)
CREAT SERPL-MCNC: 0.87 MG/DL (ref 0.6–1.1)
GFR SERPL CREATININE-BSD FRML MDRD: >60 ML/MIN/1.73M2
GGT SERPL-CCNC: 141 U/L (ref 0–50)
GLUCOSE BLD-MCNC: 84 MG/DL (ref 70–125)
POTASSIUM BLD-SCNC: 4.7 MMOL/L (ref 3.5–5)
PROT SERPL-MCNC: 8.1 G/DL (ref 6–8)
PROT SERPL-MCNC: 8.1 G/DL (ref 6–8)
SODIUM SERPL-SCNC: 141 MMOL/L (ref 136–145)

## 2021-04-02 ENCOUNTER — COMMUNICATION - HEALTHEAST (OUTPATIENT)
Dept: FAMILY MEDICINE | Facility: CLINIC | Age: 46
End: 2021-04-02

## 2021-04-05 ENCOUNTER — COMMUNICATION - HEALTHEAST (OUTPATIENT)
Dept: FAMILY MEDICINE | Facility: CLINIC | Age: 46
End: 2021-04-05

## 2021-04-07 ENCOUNTER — OFFICE VISIT - HEALTHEAST (OUTPATIENT)
Dept: FAMILY MEDICINE | Facility: CLINIC | Age: 46
End: 2021-04-07

## 2021-04-07 DIAGNOSIS — Z51.81 ENCOUNTER FOR THERAPEUTIC DRUG MONITORING: ICD-10-CM

## 2021-04-07 DIAGNOSIS — F33.42 RECURRENT MAJOR DEPRESSIVE DISORDER, IN FULL REMISSION (H): ICD-10-CM

## 2021-04-07 LAB
ATRIAL RATE - MUSE: 55 BPM
DIASTOLIC BLOOD PRESSURE - MUSE: NORMAL
INTERPRETATION ECG - MUSE: NORMAL
P AXIS - MUSE: 0 DEGREES
PR INTERVAL - MUSE: 168 MS
QRS DURATION - MUSE: 90 MS
QT - MUSE: 434 MS
QTC - MUSE: 415 MS
R AXIS - MUSE: 15 DEGREES
SYSTOLIC BLOOD PRESSURE - MUSE: NORMAL
T AXIS - MUSE: 33 DEGREES
VENTRICULAR RATE- MUSE: 55 BPM

## 2021-04-07 RX ORDER — CITALOPRAM HYDROBROMIDE 20 MG/1
20 TABLET ORAL EVERY EVENING
Qty: 90 TABLET | Refills: 3 | Status: SHIPPED | OUTPATIENT
Start: 2021-04-07 | End: 2021-07-12

## 2021-04-07 ASSESSMENT — ANXIETY QUESTIONNAIRES
GAD7 TOTAL SCORE: 12
2. NOT BEING ABLE TO STOP OR CONTROL WORRYING: MORE THAN HALF THE DAYS
3. WORRYING TOO MUCH ABOUT DIFFERENT THINGS: MORE THAN HALF THE DAYS
7. FEELING AFRAID AS IF SOMETHING AWFUL MIGHT HAPPEN: MORE THAN HALF THE DAYS
6. BECOMING EASILY ANNOYED OR IRRITABLE: SEVERAL DAYS
4. TROUBLE RELAXING: MORE THAN HALF THE DAYS
1. FEELING NERVOUS, ANXIOUS, OR ON EDGE: MORE THAN HALF THE DAYS
5. BEING SO RESTLESS THAT IT IS HARD TO SIT STILL: SEVERAL DAYS

## 2021-04-07 ASSESSMENT — PATIENT HEALTH QUESTIONNAIRE - PHQ9: SUM OF ALL RESPONSES TO PHQ QUESTIONS 1-9: 9

## 2021-04-07 ASSESSMENT — MIFFLIN-ST. JEOR: SCORE: 1287.18

## 2021-04-13 ENCOUNTER — COMMUNICATION - HEALTHEAST (OUTPATIENT)
Dept: RHEUMATOLOGY | Facility: CLINIC | Age: 46
End: 2021-04-13

## 2021-04-23 ENCOUNTER — AMBULATORY - HEALTHEAST (OUTPATIENT)
Dept: LAB | Facility: CLINIC | Age: 46
End: 2021-04-23

## 2021-04-23 DIAGNOSIS — N25.89 TYPE II RTA: ICD-10-CM

## 2021-04-23 DIAGNOSIS — R79.89 ELEVATED LFTS: ICD-10-CM

## 2021-04-23 DIAGNOSIS — M35.04 SJOGREN'S SYNDROME WITH TUBULO-INTERSTITIAL NEPHROPATHY (H): ICD-10-CM

## 2021-04-23 DIAGNOSIS — M19.90 INFLAMMATORY ARTHRITIS: ICD-10-CM

## 2021-04-23 LAB
ALBUMIN SERPL-MCNC: 3.9 G/DL (ref 3.5–5)
ALP SERPL-CCNC: 114 U/L (ref 45–120)
ALT SERPL W P-5'-P-CCNC: 15 U/L (ref 0–45)
ANION GAP SERPL CALCULATED.3IONS-SCNC: 8 MMOL/L (ref 5–18)
AST SERPL W P-5'-P-CCNC: 15 U/L (ref 0–40)
BASOPHILS # BLD AUTO: 0 THOU/UL (ref 0–0.2)
BASOPHILS NFR BLD AUTO: 0 % (ref 0–2)
BILIRUB SERPL-MCNC: 0.5 MG/DL (ref 0–1)
BUN SERPL-MCNC: 17 MG/DL (ref 8–22)
CALCIUM SERPL-MCNC: 8.9 MG/DL (ref 8.5–10.5)
CHLORIDE BLD-SCNC: 108 MMOL/L (ref 98–107)
CK SERPL-CCNC: 43 U/L (ref 30–190)
CO2 SERPL-SCNC: 23 MMOL/L (ref 22–31)
CREAT SERPL-MCNC: 0.86 MG/DL (ref 0.6–1.1)
EOSINOPHIL # BLD AUTO: 0.1 THOU/UL (ref 0–0.4)
EOSINOPHIL NFR BLD AUTO: 1 % (ref 0–6)
ERYTHROCYTE [DISTWIDTH] IN BLOOD BY AUTOMATED COUNT: 14.6 % (ref 11–14.5)
GFR SERPL CREATININE-BSD FRML MDRD: >60 ML/MIN/1.73M2
GGT SERPL-CCNC: 38 U/L (ref 0–50)
GLUCOSE BLD-MCNC: 79 MG/DL (ref 70–125)
HCT VFR BLD AUTO: 38.6 % (ref 35–47)
HGB BLD-MCNC: 12.6 G/DL (ref 12–16)
IMM GRANULOCYTES # BLD: 0 THOU/UL
IMM GRANULOCYTES NFR BLD: 0 %
LYMPHOCYTES # BLD AUTO: 1.1 THOU/UL (ref 0.8–4.4)
LYMPHOCYTES NFR BLD AUTO: 20 % (ref 20–40)
MCH RBC QN AUTO: 28.8 PG (ref 27–34)
MCHC RBC AUTO-ENTMCNC: 32.6 G/DL (ref 32–36)
MCV RBC AUTO: 88 FL (ref 80–100)
MONOCYTES # BLD AUTO: 0.5 THOU/UL (ref 0–0.9)
MONOCYTES NFR BLD AUTO: 9 % (ref 2–10)
NEUTROPHILS # BLD AUTO: 4 THOU/UL (ref 2–7.7)
NEUTROPHILS NFR BLD AUTO: 69 % (ref 50–70)
PLATELET # BLD AUTO: 195 THOU/UL (ref 140–440)
PMV BLD AUTO: 10.4 FL (ref 7–10)
POTASSIUM BLD-SCNC: 4.7 MMOL/L (ref 3.5–5)
PROT SERPL-MCNC: 7.9 G/DL (ref 6–8)
RBC # BLD AUTO: 4.37 MILL/UL (ref 3.8–5.4)
SODIUM SERPL-SCNC: 139 MMOL/L (ref 136–145)
WBC: 5.7 THOU/UL (ref 4–11)

## 2021-04-27 ENCOUNTER — COMMUNICATION - HEALTHEAST (OUTPATIENT)
Dept: RHEUMATOLOGY | Facility: CLINIC | Age: 46
End: 2021-04-27

## 2021-04-27 DIAGNOSIS — M19.90 INFLAMMATORY ARTHRITIS: ICD-10-CM

## 2021-05-10 ENCOUNTER — COMMUNICATION - HEALTHEAST (OUTPATIENT)
Dept: RHEUMATOLOGY | Facility: CLINIC | Age: 46
End: 2021-05-10

## 2021-05-11 ENCOUNTER — COMMUNICATION - HEALTHEAST (OUTPATIENT)
Dept: RHEUMATOLOGY | Facility: CLINIC | Age: 46
End: 2021-05-11

## 2021-05-11 DIAGNOSIS — R76.0 ANTICARDIOLIPIN ANTIBODY POSITIVE: ICD-10-CM

## 2021-05-11 DIAGNOSIS — M19.90 INFLAMMATORY ARTHRITIS: ICD-10-CM

## 2021-05-17 ENCOUNTER — COMMUNICATION - HEALTHEAST (OUTPATIENT)
Dept: RHEUMATOLOGY | Facility: CLINIC | Age: 46
End: 2021-05-17

## 2021-05-18 ENCOUNTER — COMMUNICATION - HEALTHEAST (OUTPATIENT)
Dept: FAMILY MEDICINE | Facility: CLINIC | Age: 46
End: 2021-05-18

## 2021-05-18 DIAGNOSIS — E87.6 HYPOKALEMIA: ICD-10-CM

## 2021-05-19 ENCOUNTER — COMMUNICATION - HEALTHEAST (OUTPATIENT)
Dept: FAMILY MEDICINE | Facility: CLINIC | Age: 46
End: 2021-05-19

## 2021-05-19 DIAGNOSIS — E87.6 HYPOKALEMIA: ICD-10-CM

## 2021-05-20 RX ORDER — SODIUM BICARBONATE 650 MG/1
TABLET ORAL
Qty: 120 TABLET | Refills: 0 | Status: SHIPPED | OUTPATIENT
Start: 2021-05-20 | End: 2021-11-06

## 2021-05-26 ASSESSMENT — PATIENT HEALTH QUESTIONNAIRE - PHQ9: SUM OF ALL RESPONSES TO PHQ QUESTIONS 1-9: 6

## 2021-05-27 ASSESSMENT — PATIENT HEALTH QUESTIONNAIRE - PHQ9
SUM OF ALL RESPONSES TO PHQ QUESTIONS 1-9: 9
SUM OF ALL RESPONSES TO PHQ QUESTIONS 1-9: 3

## 2021-05-27 NOTE — PROGRESS NOTES
Outpatient Mental Health Treatment Plan    Name:  Jennyfer Terrazas  :  1975  MRN:  [id number]    Treatment Plan:  Updated Treatment Plan  Intake/initial treatment plan date:  18  Benefit and risks and alternatives have been discussed: Yes  Is this treatment appropriate with minimal intrusion/restrictions: Yes  Estimated duration of treatment:  Approximately 6-8 sessions.  Anticipated frequency of services:  Every 2-3 weeks  Necessity for frequency: This frequency is needed to establish therapeutic goals and for continuity of care in order to monitor progress.  Necessity for treatment: To address cognitive, behavioral, and/or emotional barriers in order to work toward goals and to improve quality of life.    Session Type: Patient is presenting for an Individual session.    Plan:           ?   ? Anxiety    Goal:  Decrease average anxiety level from 2/4 to 1/4.   Strategies: ? [x]Learn and practice relaxation techniques and other coping        strategies (e.g., thought stopping, reframing, meditation)     ? [x] Increase involvement in meaningful activities     ? [x] Discuss sleep hygiene     ? [x] Explore thoughts and expectations about self and others     ? [x] Identify and monitor triggers for panic/anxiety symptoms     ? [x] Implement physical activity routine (with physician approval)     ? [x] Consider introduction of bibliotherapy and/or videos     ? [x] Continue compliance with medical treatment plan (or explore          barriers)                                          ?Degree to which this is a problem: 2  Degree to which goal is met: patient has made progress in her goal.  Goal remains relevant.   Date of Review: in 3 months.     ? Depression    Goal:  Decrease average depression level from 2/4 to 1/4.   Strategies:    ?[x] Decrease social isolation     [x] Increase involvement in meaningful activities     ?[x] Discuss sleep hygiene     ?[x] Explore thoughts and expectations about self and  "others     ?[x] Process grief (loss of significant person, independence, role,        etc.)     ?[x] Assess for suicide risk     ?[x] Implement physical activity routine (with physician approval)     [x] Consider introduction of bibliotherapy and/or videos     [x] Continue compliance with medical treatment plan (or explore         barriers)       ?  Degree to which this is a problem: 2  Degree to which goal is met: progress noted. Goal remains relevant.    Date of Review: in 3 months.         Functional Impairment:  1=Not at all/Rarely  2=Some days  3=Most Days  4=Every Day    Personal : 2  Family : 2  Social : 2   Work/school : 2    Diagnosis:  ( Major depressive disorder, recurrent, mild; Generalized Anxiety disorder    WHODAS 2.0 12-item version 7      Scores presented in qualifiers to represent level of disability.  MILD Problem (slight, low, ...) 5-24%    H1= 2  H2= 0  H3= 0    Clinical assessments and measures completed:. NIKITA-7, PHQ-9 and CAGE-AID, C-SSRS     Strengths: Jennyfer has many strengths.  She is insightful, has a strong joselyn, values her family, is creative and enjoys reading.   Limitations:  Jennyfer considers that shyness is a limitation for her. She acknowledges a tendency to be distrustful of others.   Cultural Considerations: Jennyfer is of [place] and [place] heritage. She identified a tendency in her father to de-emphasize her Hungarian heritage. She also identified the influence/impact of her father's alcoholism on her family system.  She identified a value transmuted from her father of \"family is everything.\"     Persons responsible for this plan: Patient            Psychotherapist Signature           Patient Signature:              Provider: Performed and documented by Reyna Sloan [facility]   Date:  4/1/2019      "

## 2021-05-27 NOTE — PROGRESS NOTES
"Mental Health Visit Note    4/1/2019    Start time: 9:12 AM    Stop Time: 10:03 AM   Session # 1    Session Type: Patient is presenting for an Individual session.    Jennyfer Terrazas is a 43 y.o. female is being seen today for    Chief Complaint   Patient presents with     Follow-up     Anxiety     worry     Depression     occasional low mood, fatigue   .     New symptoms or complaints: Patient is returning today to re-engage in psychotherapy.  Last seen on 8/1/18.  She provided updates on psychological inventories.  She identified anxiety and depression symptoms; however, she acknowledged feeling better after applying some of the strategies discussed in previous sessions.  She also noted that it has been helpful nurturing good sleep hygiene.  Changed depression modifier from moderate to mild given patient's report of sustained improvement in symptom severity.  Updated Treatment Plan.      Functional Impairment:   Personal: 3  Family: 2  Work: 2  Social:2    Clinical assessment of mental status: Jennyfer presented on time for her appointment. She was oriented x3, open and cooperative, and dressed appropriately for this session and weather. Her memory was normal cognitive functioning. Her speech was within normal limits. Language was appropriate to discussion. Concentration and focus is within normal limits. Psychosis is not noted nor reported. Her mood is mildly anxious, affect is euthymic. Fund of knowledge is adequate. Insight is adequate for therapy.    Suicidal/Homicidal Ideation present: None Reported This Session    Patient's impression of their current status: Patient stated, \"Everything's going pretty good lately. I'm working on getting more sleep, taking vitamins, and trying to exercise. I can actually read a book now. Sleep helped. There's not as much anxiety as it was.\"  She discussed some of the efforts she has made to help her feel better, and considered that, although she still experiences anxiety, she is " "able to recover faster from these feelings.  She noticed that in moments she has felt stuck in anxiety, it feels like her mind goes blank.  She stated, \"I think I was pushing down on the pain or whatever I was repressing as a little kid,\" as she reflected on what she believes has been a long-standing tendency to \"stuff feelings.\"  She considered where this tendency may have been adaptive in her childhood. She discussed the impact of her father's substance abuse issues.  She is authoring the values that are most meaningful in her life (personal growth, authenticity, family, healing, joselyn) and the behaviors that serve them.    Therapist impression of patients current state: Patient is re-engaging in the therapeutic process. Her thoughts, feelings, and beliefs were processed. She appeared less symptomatic in session today, and, indeed, acknowledged a reduction in her mental health symptoms.  She is willing to explore and apply CBT and mindfulness strategies for improved symptom management.  She is insightful.       Type of psychotherapeutic technique provided: Insight oriented, Client centered, Solution-focused, CBT and ACT (Acceptance Commitment Therapy)    Progress toward short term goals:Goals reached, new goals established; patient has experienced a reduction in mental health symptoms.  She remains open to applying therapeutic techniques for continued improvement in symptoms.  New goals established, Treatment Plan updated to reflect this progress.      Review of long term goals: Patient is making progress on her long-term goals.  Treatment Plan updated today.    Diagnosis:   1. Major depressive disorder, recurrent episode, mild (H)    2. Generalized anxiety disorder        Plan and Follow up: Patient plans to continue to nurture healthy routines including sleep hygiene, healthy meals, and physical activity. She plans to apply 3-2-1 grounding technique for moments of acute anxiety, as needed.  She plans on " practicing diaphragmatic breathing to generally increase well-being.  She plans on noticing thoughts and feelings with a spirit of curiosity and acceptance with a commitment to engage in healthy, value-based behaviors in the presence of aversive thoughts or feelings.  She plans to write in a journal with particular notice to when she is hard on self - a tendency identified in session.  She plans to return for follow up in 2-3 weeks.  Addendum: placed psychological inventories into flowsheets.  Sent WHODAS for scanning into chart.        Discharge Criteria/Planning: Patient will continue with follow-up until therapy can be discontinued without return of signs and symptoms.    Reyna Sloan 4/1/2019   1+

## 2021-05-28 ASSESSMENT — ANXIETY QUESTIONNAIRES
GAD7 TOTAL SCORE: 2
GAD7 TOTAL SCORE: 12

## 2021-05-30 VITALS — WEIGHT: 152 LBS | HEIGHT: 61 IN | BODY MASS INDEX: 28.7 KG/M2

## 2021-05-31 ENCOUNTER — RECORDS - HEALTHEAST (OUTPATIENT)
Dept: ADMINISTRATIVE | Facility: CLINIC | Age: 46
End: 2021-05-31

## 2021-05-31 VITALS — HEIGHT: 61 IN | WEIGHT: 156 LBS | BODY MASS INDEX: 29.45 KG/M2

## 2021-05-31 NOTE — PROGRESS NOTES
FEMALE PREVENTIVE EXAM    Assessment & Plan   1. Routine general medical examination at a health care facility  Fasting labs, up to date on mammogram. Pap updated today, if normal repeat in five years.      2. Vertigo  Episodes sound consistent with vertigo though is also experiencing episodes of hearing loss and fullness as well.  Consider Meniere's. Referral to ENT for evaluation  - Ambulatory referral to ENT    3. Bilateral hearing loss, unspecified hearing loss type  - Ambulatory referral to ENT    4. Lightheadedness  - HM2(CBC w/o Differential)    5. Vitamin D deficiency  Recheck, likely start high dose supplement.   - Vitamin D, Total (25-Hydroxy)    6. Vitamin B12 deficiency (non anemic)  Level normal when checked earlier this year    7. Screening for cervical cancer  - Gynecologic Cytology (PAP Smear)    8. Screening for cholesterol level  - Lipid Cascade    9. Screening for diabetes mellitus  - Comprehensive Metabolic Panel    Recommend repeat pap smear if normal every five years, Recommended adequate calcium intake/osteoporosis prevention, Discussed breast cancer screening guidelines, Discussed colon cancer screening at age 50, 45 if -American and Discussed diet, including moderation of portions sizes, avoiding eating out and fast food and increase in fruits and vegetables    Lindsay Arreola CNP    Subjective:   Chief Complaint:  Annual Exam (fasting )    HPI:  Jennyfer Terrazas is a 44 y.o. female who presents for routine physical exam.  She is a patient of Dr. Rainey. History of depression, NIKITA, B12 deficiency, vitamin D deficiency. Works in a [employer] store.     Vitamin D 10.8 earlier this year. Not taking supplement.  In discussing this she wonders if this can cause dizziness.  Episodes at work where she experiences room spinning. Nausea as well.  Improves with rest. Has noted hearing loss as well that comes in waves. Feels like pressure in the ears.      OB/Gyn History:  Menstrual history:  regular periods, have become lighter  Date of previous pap: 2014  History of abnormal pap: none    Preventive Health:  Reviewed and recommended screening and treatment recommendations:  Mammography: 03/2019  Colonoscopy: no [facility]  Immunizations: up to date    Health Habits:    Exercise: no.  Calcium intake/Osteoporosis prevention: no    PMH:   Patient Active Problem List   Diagnosis     Major Depression, Recurrent     Anemia     Generalized Anxiety Disorder     Lower resp. tract infection     Vitamin B12 deficiency (non anemic)     Vitamin D deficiency     Anti-TPO antibodies present       Past Medical History:   Diagnosis Date     Anxiety      Depression        Current Medications: Reviewed   Current Outpatient Medications on File Prior to Visit   Medication Sig Dispense Refill     citalopram (CELEXA) 20 MG tablet Take 1.5 tablets (30 mg total) by mouth daily. 135 tablet 3     ibuprofen (ADVIL,MOTRIN) 200 MG tablet Take 200 mg by mouth as needed for pain.       No current facility-administered medications on file prior to visit.        Allergies:  Reviewed  is allergic to acetaminophen-codeine and grass.    Social History:  Social History     Occupational History     Not on file   Tobacco Use     Smoking status: Never Smoker     Smokeless tobacco: Never Used   Substance and Sexual Activity     Alcohol use: No     Drug use: No     Sexual activity: Not Currently     Partners: Male     Birth control/protection: None       Family History:   Family History   Problem Relation Age of Onset     Arthritis Mother      Cancer Mother      Depression Mother      Hearing loss Mother      Vision loss Mother      Cancer Father         leukemia      Hearing loss Father      Asthma Paternal Aunt      Diabetes Maternal Grandfather      Hypertension Maternal Grandfather      Arthritis Paternal Grandmother      Heart disease Paternal Grandmother      Hypertension Paternal Grandmother      Kidney disease Paternal Grandmother      Mental  "illness Paternal Grandmother      Alcohol abuse Paternal Grandfather      Arthritis Paternal Grandfather      Asthma Paternal Grandfather      Clotting disorder Paternal Grandfather      Dementia Paternal Grandfather      Depression Paternal Grandfather          Review of Systems:  Complete head to toe review of systems is otherwise negative except as above.    Objective:    /62   Pulse (!) 49   Ht 5' 1.1\" (1.552 m)   Wt 162 lb (73.5 kg)   SpO2 99%   BMI 30.51 kg/m      GENERAL: Alert, well-appearing female .   PSYCH: Pleasant mood, affect appropriate.   SKIN: No atypical lesions  EYES: Conjunctiva pink, sclera white, no exudates. SHIRLENE.  EOMs intact.   EARS: TMs pearly grey, no bulging, redness, retraction.   MOUTH: Pharynx moist, pink without exudate. No tonsillar enlargement  NECK: No lymphadenopathy. Thyroid borders smooth without enlargement, nodules.   CV: Regular rate and rhythm without murmurs, rubs or gallops.  RESP: Lung sounds clear  ABDOMEN: BS+. Abdomen soft.  No organomegaly  BREASTS: Breasts symmetric, no dimpling, masses or skin discolorations seen. Areolas and nipples symmetric without discharge. On palpation, breast tissue supple and nontender. No masses or nodules. Axillary and epitrochlear lymph nodes nonpalpable.    FEMALE: External genitalia without lesions. Vaginal walls and cervix without lesions or masses. No abnormal discharge. Pap smear obtained. On bimanual palpation, uterus mobile, normal shape and contour. No adnexal masses or tenderness.   PV :  No edema        "

## 2021-05-31 NOTE — PATIENT INSTRUCTIONS - HE
Recommendations from today's visit                                                       1. Your dizziness sounds consistent with vertigo.  However, as you are also experiencing hearing loss I would recommend a consult with ENT/audiology and will place a referral order for you.     2.  I will contact you with the results of your labs. If your vitamin D remains quite low we will start a replacement supplement      Next appointment: one year    To reschedule your appointment, please call the clinic directly at [phone].   It was a pleasure seeing you today! I look forward to seeing you again.

## 2021-06-01 ENCOUNTER — RECORDS - HEALTHEAST (OUTPATIENT)
Dept: ADMINISTRATIVE | Facility: CLINIC | Age: 46
End: 2021-06-01

## 2021-06-01 NOTE — PROGRESS NOTES
"Audiology Report    Summary: Audiology visit completed. Please see audiogram in \"media\" tab for case history and results.     Transducer: Circumaural headphones    Reliability was good  and there was good  SRT to PTA agreement.       Plan:  The patient is returned to ENT for follow up. Return for retesting with ENT recommendation.      Burt Hart.  Clinical Audiologist  [place] #[id number]    "

## 2021-06-01 NOTE — PROGRESS NOTES
Jennyfer Terrazas is a 44 y.o. female seen in consultation at the request of Dr. Rainey for dizziness.  Onset: 2 months ago  Episodic vs Chronic: episodic  Length of episodes: minutes  Description of episodes: Room spinning  Last episode: yesterady  Frequency of episodes: at least 3 or more times pre day.    Positional: no  Change in hearing with episodes:  no  Otologic history of infections or surgery: ear infections but no surgeries.  History of headaches:   She has been having visual scotoma with photophobia recently.    Headaches with episodes: sometimes  History of head trauma: no  Previous evaluations: no  Previous medications: no      ALLERGY:    Allergies   Allergen Reactions     Acetaminophen-Codeine      Grass        MEDICATIONS:     Current Outpatient Medications on File Prior to Visit   Medication Sig Dispense Refill     cholecalciferol, vitamin D3, 50,000 unit capsule Take 50,000 Units by mouth once a week for 8 doses. 8 capsule 0     citalopram (CELEXA) 20 MG tablet Take 1.5 tablets (30 mg total) by mouth daily. 135 tablet 3     ibuprofen (ADVIL,MOTRIN) 200 MG tablet Take 200 mg by mouth as needed for pain.       No current facility-administered medications on file prior to visit.        Past Medical/Surgical History, Family History and Social History reviewed in detail and documented separately in the medical record.    Complete Review of Systems:  A 10-point review was performed.  Pertinent positives are noted in the HPI and on a separate scanned document in the chart.    EXAM:  There were no vitals filed for this visit.    Nurse documentation reviewed  and documented separately.    General Appearance: Pleasant, alert, appropriate appearance for age. No acute distress    Head Exam: Normal. Normocephalic, atraumatic.    Eye Exam: Normal external eye, conjunctiva, lids, cornea. Extra-ocular movements are intact.    Left external ear: normal  Left otoscopic exam: Normal EAC. Normal TM     Right external  ear: normal  Right otoscopic exam: Normal EAC. Normal TM    Nose Exam: Normal external nose. Septum midline. Nasal mucosa normal.  Inferior turbinates normal.    OroPharynx Exam: Dental hygiene adequate. Normal tongue. Normal buccal mucosa. Normal palate.  Normal pharynx. Normal tonsils.    Neck Exam: Supple, no masses or nodes. Trachea and larynx midline.    Thyroid Exam: No tenderness, nodules or enlargement.    Salivary Glands: nontender without masses    Neuro: Alert and oriented times 3, CN 2-12 grossly intact, no nystagmus, PERRL, EOMI, normal speech and gait    Chest/Respiratory Exam: Normal chest wall motion and respiratory effort. No audible stridor or wheezing.    Cardiovascular Exam: Regular rate and rhythm.  No cyanosis, clubbing or edema.    Pulses: carotid pulses normal    Vestibular:  Gait is normal, tandem gait is normal, Romberg is normal, Colbert-Hallpike is normal, Finger-nose-finger is normal, Fukuda is normal    ASSESSMENT:  1. Dizziness        PLAN: Findings, assessment, and management options were discussed. This does sound vestibular and certainly could be a central vestibular issue with the migraines.  Would recommend VNG to see if this shows a peripheral component.  Follow up with results.

## 2021-06-02 VITALS — WEIGHT: 171 LBS | BODY MASS INDEX: 32.2 KG/M2

## 2021-06-02 NOTE — PROGRESS NOTES
"HPI:  She is here for review of VNG findings.  When asked if the testing reproduced her syptoms, response was \"... I think so?\".  She continues to hve episodes daily.  She continues to regularly have a headache as a part of this.     Past medical history, surgical history, social history, family history, medications, and allergies have been reviewed with the patient and are documented above.    Review of Systems: a 10-system review was performed. Pertinent positives are noted in the HPI and on a separate scanned document in the chart.    PHYSICAL EXAMINATION:  GEN: no acute distress, normocephalic  EYES: extraocular movements are intact, pupils are equal and round. Sclera clear.   EARS: auricles are normally formed. The external auditory canals are clear with minimal to no cerumen. Tympanic membranes are intact bilaterally with no signs of infection, effusion, retractions, or perforations.  NEURO: CN II-XII are intact bilaterally. alert and oriented. No nystagmus. Gait is normal.  PULM: breathing comfortably on room air, normal chest expansion with respiration  HEART: regular rate and rhythm, no peripheral edema    AUDIOGRAM: Reviewed from previous visit:  Entirely normal    MEDICAL DECISION-MAKING:   There is no evidence on VNG or audiogram to demonstrate a peripheral vestibulopathy.  Her positional testing, her [place]-Hallpike and calorics were entirely normal.  I feel very comfortable saying thi is not BPPV.  Recommend Neurology as there is a consistent headache component.    "

## 2021-06-02 NOTE — PROGRESS NOTES
Contacted patient at her cell number, [phone], and left a discrete message wishing her well with encouragement to call to schedule follow up, as needed.

## 2021-06-03 VITALS — WEIGHT: 162 LBS | BODY MASS INDEX: 30.58 KG/M2 | HEIGHT: 61 IN

## 2021-06-04 VITALS
SYSTOLIC BLOOD PRESSURE: 100 MMHG | WEIGHT: 165 LBS | HEART RATE: 60 BPM | BODY MASS INDEX: 31.07 KG/M2 | DIASTOLIC BLOOD PRESSURE: 68 MMHG

## 2021-06-05 VITALS
SYSTOLIC BLOOD PRESSURE: 114 MMHG | WEIGHT: 153.31 LBS | DIASTOLIC BLOOD PRESSURE: 70 MMHG | OXYGEN SATURATION: 100 % | HEART RATE: 56 BPM | TEMPERATURE: 98.7 F | BODY MASS INDEX: 29.94 KG/M2

## 2021-06-05 VITALS
DIASTOLIC BLOOD PRESSURE: 85 MMHG | WEIGHT: 146.25 LBS | SYSTOLIC BLOOD PRESSURE: 136 MMHG | TEMPERATURE: 98.1 F | OXYGEN SATURATION: 99 % | RESPIRATION RATE: 20 BRPM | BODY MASS INDEX: 27.54 KG/M2 | HEART RATE: 72 BPM

## 2021-06-05 VITALS
DIASTOLIC BLOOD PRESSURE: 70 MMHG | SYSTOLIC BLOOD PRESSURE: 118 MMHG | BODY MASS INDEX: 30.04 KG/M2 | HEIGHT: 60 IN | HEART RATE: 72 BPM | WEIGHT: 153 LBS | OXYGEN SATURATION: 98 %

## 2021-06-05 VITALS
OXYGEN SATURATION: 99 % | DIASTOLIC BLOOD PRESSURE: 70 MMHG | HEART RATE: 59 BPM | WEIGHT: 162.19 LBS | TEMPERATURE: 97.7 F | HEIGHT: 60 IN | BODY MASS INDEX: 31.84 KG/M2 | SYSTOLIC BLOOD PRESSURE: 130 MMHG

## 2021-06-09 ENCOUNTER — RECORDS - HEALTHEAST (OUTPATIENT)
Dept: ADMINISTRATIVE | Facility: OTHER | Age: 46
End: 2021-06-09

## 2021-06-09 DIAGNOSIS — M19.90 INFLAMMATORY ARTHRITIS: ICD-10-CM

## 2021-06-09 DIAGNOSIS — N25.89 TYPE II RTA: ICD-10-CM

## 2021-06-09 DIAGNOSIS — M35.04 SJOGREN'S SYNDROME WITH TUBULO-INTERSTITIAL NEPHROPATHY (H): ICD-10-CM

## 2021-06-09 RX ORDER — HYDROXYCHLOROQUINE SULFATE 200 MG/1
200 TABLET, FILM COATED ORAL 2 TIMES DAILY
Qty: 60 TABLET | Refills: 0 | Status: SHIPPED | OUTPATIENT
Start: 2021-06-09 | End: 2022-03-15

## 2021-06-10 NOTE — PROGRESS NOTES
Assessment/Plan:        Diagnoses and all orders for this visit:    GERD (gastroesophageal reflux disease)    Bronchospasm  -     albuterol (VENTOLIN HFA) 90 mcg/actuation inhaler; Inhale 2 puffs every 6 (six) hours as needed for wheezing.  Dispense: 1 Inhaler; Refill: 12    Will try doubling the Omeprazole to twice a day. I would like ot hear from her in 3 days if no improvement.   We discussed dietary things to watch for and to avoid. If improvement on incresed dose of Prilosec, then to continue the   Double dose x 1 month and then stop         Subjective:    Patient ID: Jennyfer Terrazas is a 41 y.o. female.    Gastroesophageal Reflux   She complains of abdominal pain, belching, chest pain, coughing, heartburn, nausea and a sore throat. She reports no choking, no dysphagia, no early satiety, no globus sensation, no stridor or no wheezing. This is a new problem. The current episode started in the past 7 days. The problem occurs constantly. The problem has been gradually worsening. The heartburn duration is more than one hour. The heartburn is located in the substernum and abdomen. The heartburn is of severe intensity. The heartburn wakes her from sleep. The heartburn limits her activity. The heartburn doesn't change with position. The symptoms are aggravated by certain foods, lying down and stress (she is under extreme stress at present -  with injjury and thinking of quitting job ). Associated symptoms include fatigue. Risk factors include obesity. She has tried a PPI (OTC omeprazole) for the symptoms. The treatment provided mild relief.       The following portions of the patient's history were reviewed and updated as appropriate: allergies, current medications, past family history, past medical history, past social history, past surgical history and problem list.    Review of Systems   Constitutional: Positive for appetite change and fatigue. Negative for activity change and unexpected weight change.   HENT:  Positive for sore throat.    Respiratory: Positive for cough. Negative for choking and wheezing.    Cardiovascular: Positive for chest pain.   Gastrointestinal: Positive for abdominal pain, heartburn, nausea and vomiting. Negative for blood in stool, constipation, diarrhea and dysphagia.   Neurological: Negative for light-headedness.   All other systems reviewed and are negative.            Objective:    Physical Exam   Constitutional: She is oriented to person, place, and time. She appears well-developed and well-nourished. No distress.   Pulmonary/Chest: Effort normal and breath sounds normal. She has no wheezes. She has no rales. She exhibits no tenderness.   Abdominal: Soft. Bowel sounds are normal. She exhibits no distension and no mass. There is no tenderness. There is no rebound and no guarding.   Neurological: She is alert and oriented to person, place, and time.   Skin: Skin is warm and dry.   Nursing note and vitals reviewed.

## 2021-06-12 NOTE — PROGRESS NOTES
Chief Complaint   Patient presents with     Spasms     was hiking 2 weeks ago and thinks she over used her muscle. Now she's having a hard time standing up and fell flat on her face yesterday.        HPI:  Jennyfer Terrazas is a 45 y.o. female who presents today complaining of significant and progressive worsening of bilateral lower extremity muscle weakness and now including upper extremities following a vigorous hike in [facility] about 2 week ago.     Patient reports that weakness has become so pronounced that she is unable to walk without a cane and needs assistance getting in and out of bed or chair. Today while in the restroom she reports falling face forward trying to rise to stand due to lower extremity weakness, bumped lip, no LOC or head injury.     Denies any familial or past medical history of neuromuscular weakness disorders or disease. Denies any recent illnesses or COVID positive exposures. Denies any new medication changes.     History obtained from the patient.    Problem List:  2019-02: Vitamin B12 deficiency (non anemic)  2019-02: Vitamin D deficiency  2019-02: Anti-TPO antibodies present  2015-04: Lower resp. tract infection  Major Depression, Recurrent  Anemia  Generalized Anxiety Disorder      Past Medical History:   Diagnosis Date     Anxiety      Depression        Social History     Tobacco Use     Smoking status: Never Smoker     Smokeless tobacco: Never Used   Substance Use Topics     Alcohol use: No       Review of Systems   Constitutional: Negative for activity change, appetite change, chills, diaphoresis, fatigue and fever.   Musculoskeletal: Positive for myalgias.   Neurological: Positive for weakness and numbness. Negative for dizziness and headaches.   Psychiatric/Behavioral: Negative.    All other systems reviewed and are negative.      Vitals:    10/12/20 0952   BP: 136/85   Patient Site: Right Arm   Patient Position: Sitting   Cuff Size: Adult Regular   Pulse: 72   Resp: 20    Temp: 98.1  F (36.7  C)   TempSrc: Oral   SpO2: 99%   Weight: 146 lb 4 oz (66.3 kg)       Physical Exam  Constitutional:       General: She is not in acute distress.     Appearance: She is not ill-appearing, toxic-appearing or diaphoretic.   Cardiovascular:      Rate and Rhythm: Normal rate and regular rhythm.      Pulses: Normal pulses.      Heart sounds: Normal heart sounds.   Pulmonary:      Effort: Pulmonary effort is normal.      Breath sounds: Normal breath sounds.   Musculoskeletal:         General: Tenderness present. No swelling, deformity or signs of injury.      Right lower leg: No edema.      Left lower leg: No edema.   Skin:     General: Skin is warm.      Capillary Refill: Capillary refill takes less than 2 seconds.      Coloration: Skin is not pale.      Findings: No erythema or rash.   Neurological:      Mental Status: She is alert and oriented to person, place, and time.      Sensory: Sensory deficit present.      Motor: Weakness present.      Coordination: Coordination abnormal.      Gait: Gait abnormal.      Deep Tendon Reflexes: Reflexes abnormal.      Comments: Unable to mobilize without assistance or lift head off of table.     Unable to straight leg raise due to pain. Unable to rise from chair to stand without assistance of .    Psychiatric:         Mood and Affect: Mood normal.         Behavior: Behavior normal.         No notes on file    Labs:  No results found for this or any previous visit (from the past 72 hour(s)).    Radiology: None obtained    Clinical Decision Making: At the end of the encounter, advised patient and  that given clinical presentation and acute presentation would need further evaluation for profound muscle weakness. Patient and  understood and agreed to plan.     NICOLÁS Colón, CNP     1. Generalized muscle weakness           There are no Patient Instructions on file for this visit.

## 2021-06-12 NOTE — PROGRESS NOTES
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 History of hypokalemia  - Basic Metabolic Panel    2. Generalized muscle weakness/  - CK Total  - Basic Metabolic Panel    3. Abnormal ERVIN  Work up for cause of RTA revealed elevated ERVIN  sliding scale-B/LA and SS-A/RO antibodies - concern for lupus      Has appt with rheumatology 11/24/20         Subjective:     Jennyfer Terrazas is a 45 y.o. female who presents for a hospital discharge follow up.      Hospital/Nursing Home/IP Rehab Facility: [facility]  Date of Admission: 10/12/20  Date of Discharge:10/16/  Reason(s) for Admission: low potassium             Do you have any problems taking your medication regularly?  None,  Hard to swallow, but she is able to crush the pills and take them        Have you had any changes in your medication since discharge? None       Have you had any difficulty following your discharge or treatment plan?  No    Summary of hospitalization:  Hospital discharge summary reviewed - see bottom of this note for that and for renal consult  Diagnostic Tests/Treatments reviewed.  Follow up needed: labs, appointment with rheumatology : diagnosed with likely distal renal tubular acidosis as cause of hypokalemia and hyponatremia.  Work up for cause of RTA revealed elevated ERVIN  sliding scale-B/LA and SS-A/RO antibodies - concern for lupus  Other Healthcare Providers Involved in Patient's Care: Patient Care Team:  Jared Rainey MD as PCP - General (Family Medicine)  Jared Rainey MD as Assigned PCP      Update since discharge: {improved   Information from family, SNF, care coordination: NA     Post Discharge Medication Reconciliation: discharge medications reconciled, continue medications without change  Plan of care communicated with: patient     Monday a week  before her admission Jennyfer  had gone  for a walk at [facility] and she was going up and down a lot of hills. Was getting legs cramps - later - she thought it was just because of the hills. As the week went on her she got worse and worse, and by the following Monday she had no strength - fell in the bath and hit her lip    She generally feels better, though still tired and a little week.  Pain is worse at night - she takes tylenol for this. She feels it's slower to do things - yesterday was the first day she could stand and do the dishes  Able to get to the bathroom, only takes showers standing up - she is   afraid that if she sits down something might happen - warm water helps muscles    Social History: She and her  have an 11 year old daughter  She works at a [employer] in [place] - taking time off  No auto immune in her family    Objective:     Vitals:    10/20/20 1156   BP: 114/70   Pulse: (!) 56   Temp: 98.7  F (37.1  C)   TempSrc: Oral   SpO2: 100%   Weight: 153 lb 5 oz (69.5 kg)   LMP: 09/27/2020         Physical Exam:  General appearance - alert, well appearing, and in no distress  Mental status - normal mood, behavior, speech, dress, motor activity, and thought processes  Chest - clear to auscultation, no wheezes, rales or rhonchi, symmetric air entry  Heart - normal rate, regular rhythm, normal S1, S2, no murmurs, rubs, clicks or gallops  Musculoskeletal -  3-4/5 strength to resisted testing for all groups - she was able to push back again light resistance from me, but not stronger resistance      Coding guidelines for this visit:  Type of Medical   Decision Making Face-to-Face Visit       within 7 Days of discharge Face-to-Face Visit        within 14 days of discharge   Moderate Complexity [id number]   High Complexity [id number]       Electronically signed by Jennyfer Daniels MD 10/20/20 11:55 AM

## 2021-06-12 NOTE — PROGRESS NOTES
45 y.o. female with a past medical history of anxiety, depression, and anemia, who presents to the emergency room for the evaluation fatigue, leg weakness and muscle pain,. Found to be profoundly hypokalemic      Profound hypokalemia 2/2 newly diagnosed distal RTA-resolved Potassium level of 1.4 on admission Nephrology consult TTKG of 11  - F/u with nephrology.   - Started on potassium replacement.   - Will need outpatient Rheum follow up.   - Pending SSB, SSA, dsDNA, SPEP, free light chains, immunofixation.      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   - PT/OT following as inpatient.      Rhabdomyolysis: Improved.   - Monitor as outpatient.                  Metabolic acidosis 2/2 RTA Bicarb low.   - Started on bicarb by nephrology, continue.      Depression/anxiety: PTA Citalopram.      Discharge Medications:                Medication List             START taking these medications          potassium chloride 20 mEq packet  Commonly known as: KLOR-CON  Take 20 mEq by mouth 2 (two) times a day.      sodium bicarbonate 650 MG tablet  Take 2 tablets (1,300 mg total) by mouth 2 (two) times a day. OTC product     Nephrology concult    Assessment/ Recommendations:  1. Hypokalemia: Due to likely distal RTA. profound. No n/v/d.no diuretic, laxative, or insulin use. Consider hypokalemic periodic paralysis, but TSH normal. She does not appear hypovolemic. Magnesium is not low. TTKG of 11 highly suggests renal K wasting.               -Continue aggressive replacement per protocol.               -Start potassium chloride 20 mg two times a day today. Possible will need higher dose.               -ERVIN screen is positive - below tests pending. No hx of lupus. Rheumatology eval outpatient.               -Pending SSB, SSA, dsDNA, SPEP, free light chains, immunofixation.               -RF negative                    2. Metabolic acidosis: nongap. Likely due to RTA, as above.               -Started on bicarb on 10/14.  Continue bicarb 650 mg BID      3. Rhabdo: due to hypokalemia.  Mild. Replace K. Trend. Stop IVF.       ---  [facility]. Was getting legs crsmp - thoguth becase of wlakd - as the week end on getting worse and worse - by following MOndya no strength - fell in the bath and hit her lip    Feeing tired  At night a lot of pain - taking tylenol  Better during the day -   She feels it's slower to do things - yesterday was the first day she could stand an d to the dishes  Able to get to the batrhoom  She is taking showerd - she is afriad that if she sits down something might happen - warm water hleps muslce  Able to  the shower  She has an 11 year odl daughter  She works at a [employer] in [place] - taking time off  No auto immune in her family

## 2021-06-13 NOTE — TELEPHONE ENCOUNTER
Called pt regarding Imuran. No answered. Left the message as below:    A handout Imuran was mailed to pt home address. If using contraceptive as Imuran can potential cause fetal harm if becomes pregnant.    Pt is to call with any questions or concerns at [phone].

## 2021-06-13 NOTE — TELEPHONE ENCOUNTER
Medication Question or Clarification  Who is calling: pharmacy   What medication are you calling about (include dose and sig)?: potassium chloride (KLOR-CON) 20 mEq packet  Who prescribed the medication?: Jared Rainey MD    What is your question/concern?: would like to request tablets, the packets are more expensive .  Requested Pharmacy: [facility]  Okay to leave a detailed message?: Yes

## 2021-06-13 NOTE — TELEPHONE ENCOUNTER
"Please discuss verbally with the patient.    Assuming patient is referring to medication Imuran that we discussed. Imuran offered as an option for Sjogren's disease, which is a type of autoimmune disease.  Imuran is an immunosuppressant medication used to treat an overactive immune system, which if left untreated may contribute to symptoms.      In patients case Sjogren's thought to be contributing to RTA (renal tubular acidosis).  RTA is basically a condition that occurs when the kidneys fail to excrete acids in the urine, which could lead to the patient's blood being too acidic. There are 4 different types of RTA, in patients case it involved potassium wasting leading to low blood potassium levels.  The mechanism by which Sjögren's syndrome leads to distal RTA is incompletely understood (From \"Up to date\" medical literature).  For a better understanding regarding her RTA (I believe diagnosed with type II RTA), recommend patient discuss further with her nephrologist/kidney specialist.    If does not already have handouts on Imuran and/or Sjogren's disease, please send patient handouts.    If patient desires to discuss further, recommend she schedule a sooner follow-up reassessment/appointment..  "

## 2021-06-13 NOTE — PROGRESS NOTES
Jennyfer Terrazas who presents today with a chief complaint of  No chief complaint on file.      Joint Pains: Yes  Location: fingers, ankles, elbows  Onset: Chronic  Intensity:  4/10  AM Stiffness: few Minutes  Alleviating/Aggravating Factors: Medications helpful? Yes.   Tolerating Meds: Yes  Other:      ROS:  Patient denies having: persistent dry eyes, dry mouth, recurrent oral ulcers, patchy alopecia, active rashes, photosensitivity, history of psoriasis, active chest pain, active shortness of breath, active cough, active dysuria, history of kidney stones, active abdominal pain, active diarrhea, history of hematochezia, active dysphagia, history of peptic ulcer disease, history of HIV, tuberculosis, hepatitis B or C, Lyme disease, seizure history, raynaud's, active documented fevers, recent infections, difficulty sleeping + (lately) or chronic unrefreshing sleep, involuntary weight loss, loss of appetite + , excessive fatigue, depression +, anxiety,  recurrent sinus infections, history of inflammatory eye diseases (such as uveitis, scleritis, iritis, etc).       Information gathered by medical assistant incorporated into this note, was reviewed and discussed with the patient.    Problem List:  Patient Active Problem List   Diagnosis     Major Depression, Recurrent     Anemia     Generalized Anxiety Disorder     Lower resp. tract infection     Vitamin B12 deficiency (non anemic)     Vitamin D deficiency     Anti-TPO antibodies present     Hypokalemia     Non-traumatic rhabdomyolysis     Metabolic acidosis     Generalized muscle weakness        PMH:   Past Medical History:   Diagnosis Date     Anxiety      Depression        Surgical History:  Past Surgical History:   Procedure Laterality Date     MT MYOMECTOMY 1-4,W/TOT 250GMS/<,ABD APPRCH      Description: Uterine Myomectomy;  Recorded: 05/05/2014;     UTERINE FIBROID SURGERY         Family History:  Family History   Problem Relation Age of Onset     Arthritis Mother       Cancer Mother      Depression Mother      Hearing loss Mother      Vision loss Mother      Cancer Father         leukemia      Hearing loss Father      Asthma Paternal Aunt      Diabetes Maternal Grandfather      Hypertension Maternal Grandfather      Arthritis Paternal Grandmother      Heart disease Paternal Grandmother      Hypertension Paternal Grandmother      Kidney disease Paternal Grandmother      Mental illness Paternal Grandmother      Alcohol abuse Paternal Grandfather      Arthritis Paternal Grandfather      Asthma Paternal Grandfather      Clotting disorder Paternal Grandfather      Dementia Paternal Grandfather      Depression Paternal Grandfather        Social History:   reports that she has never smoked. She has never used smokeless tobacco. She reports that she does not drink alcohol or use drugs.    Allergies:  Allergies   Allergen Reactions     Codeine Other (See Comments)     Tylenol 3, stomach pains that resulted in ER visit     Grass         Current Medications:  Current Outpatient Medications   Medication Sig Dispense Refill     citalopram (CELEXA) 20 MG tablet Take 20 mg by mouth every evening.       mv-mn/iron fum/FA/omega3,6,9#3 (WOMEN'S MULTI ORAL) Take 1 tablet by mouth daily.       potassium chloride (KLOR-CON) 20 mEq packet Take 20 mEq by mouth 2 (two) times a day. 60 packet 0     sodium bicarbonate 650 MG tablet Take 2 tablets (1,300 mg total) by mouth 2 (two) times a day. OTC product 120 tablet 0     No current facility-administered medications for this visit.            Physical Exam:  Following up today via video visit, per Covid-19 pandemic requirements.    Verbal consent has been obtained for this service by care team member.    Video call start time: 3:49 PM, failed Amwell initiated at 3:45 PM    Video call end time: 4:10 PM, thereafter continued with phone visit due to unstable video visit up until 4:30 PM.    Doximity utilized for video call.    Phone number utilized:  [[phone]]    Patient location for video visit: Home     Provider location for video visit:  Home (working remotely)        Summary/Assessment:    Pleasant 45-year-old female presents to our clinic today after being discharged from hospital in October 2020.    Patient explains that she developed weakness, fatigue and muscle aches a few days to a week after a longer than typical hike.  In the emergency room she was noted to have a low potassium, low bicarb and metabolic acidosis.  Patient was seen by nephrology.  Work-up included some autoimmune markers and was noted to have elevated SSA/SSB antibodies.  Patient diagnosed with type II RTA.  Has been on potassium supplements and the potassium level has improved/remained stable since.  Last checked October 20, 2020.  Presented to the ER on October 12, 2020.  Has never experienced similar episode in the past.    Has some dry mouth, denies having any dry eyes, dry skin or vaginal dryness.    States currently doing well just some mild weakness compared to baseline, not experiencing significant weakness, fatigue and muscle aches.    Has occasional joint pains involving fingers, wrists, elbows and ankles.  Hand pains mainly over PIPs.  On video exam no clear signs of synovitis appreciated involving hands and wrists.    States did not receiving steroids in the hospital.    Has a follow-up appointment with neurology towards the end of December 2020.    Please see below for management plan.        Pertinent rheumatology/past medical history (please refer to above for more detailed history):      Type II renal tubular acidosis (presented with hypokalemia, low bicarb, metabolic acidosis, likely secondary to Sjogren's, positive SSA/SSB antibodies.    Chronic multiple joint pains (hands, wrist, elbows ankles)        Rheumatology medications provided/suggested:    Tylenol    Pertinent medication from other providers or from otc (please refer to above for more detailed med  list):    Potassium 20 mEq  Sodium bicarb      Pertinent medications already tried:     Advil      Pertinent lab history:    Had: Low potassium and CO2, lately within normal limits    Elevated/positive: ERVIN, SSA/SSB antibodies, CK (lately normal)    Negative/unremarkable: Rheumatoid factor, other ERVIN related subsets, creatinine, serum immunofixation, glucose, TSH    Pertinent imaging/test history:          Other:  , 1 child, working: [employer], no alcohol beverage, none smoker,  No recreational drug used.    States weighs 148 pounds.    Plan:      Had a lengthy discussion with the patient regarding Sjogren's likely contributing to RTA episode with resulting symptoms.  Will obtain several labs including preparatory labs prior to initiating DMARD medication Imuran 50 mg twice a day.  Patient made aware after having labs performed to contact us to consider initiating.  We will send patient a handout on this medication.    Recommend maintaining follow-up appointment with nephrology in December 2020.  Currently on potassium and sodium bicarb supplements.    Avoid Advil, all NSAIDs.    For pain relief can take Tylenol 500-1000 mg twice daily as needed.    If experiences similar episode of muscle aches, weakness, fatigue recommend she go to the emergency room.    Avoid rigorous activities, long hikes for now.    Recommend drinking sufficient amount of fluids daily (8 cups of 8 ounces).    Follow-up in 6 weeks.    Procedure note:         Major side effect profile of medications provided/suggested were discussed with the patient.    This note was transcribed using Dragon voice recognition software as a result unintentional grammatical errors or word substitutions may have occurred. Please contact our Rheumatology department if you need any clarification or if you have any related inquiries.    Thank you for referring this patient to our clinic.      Placido Herbert DO   ...................  11/24/2020   2:47 PM

## 2021-06-13 NOTE — TELEPHONE ENCOUNTER
CMT will clarify.     Dr. Sepulveda wants writer to call in potassium chloride (20 mEq) tablets, sig take1 tablet by mouth twice daily for 5 days.     Provider requesting order with 1 refill.     Please confirm order as above and specify how many tablets pharmacy should dispense. Thank you!

## 2021-06-13 NOTE — TELEPHONE ENCOUNTER
Patient is wondering if she should start the medication given to her by Dr. Herbert before or after having her labs done. Ok to leave a message

## 2021-06-14 NOTE — TELEPHONE ENCOUNTER
RN cannot approve Refill Request    RN can NOT refill this medication med is not covered by policy/route to provider. Last office visit: Visit date not found Last Physical: Visit date not found Last MTM visit: Visit date not found Last visit same specialty: 10/20/2020 Jennyfer Daniels MD.  Next visit within 3 mo: Visit date not found  Next physical within 3 mo: Visit date not found      Amara Kim, [facility] Triage/Med Refill 1/21/2021    Requested Prescriptions   Pending Prescriptions Disp Refills     sodium bicarbonate 650 MG tablet [Pharmacy Med Name: SODIUM BICARB 650 MG  Tablet] 120 tablet 0     Sig: TAKE 2 TABLETS (1,300 MG TOTAL) BY MOUTH 2 (TWO) TIMES A DAY.       There is no refill protocol information for this order

## 2021-06-14 NOTE — TELEPHONE ENCOUNTER
"Please give pt a call to schedule for her injection on Jan. 11, 2021. Thank you     \"Please schedule patient for injection day visit of  1/11/2020 as a f/u visit. She will need to connect to clinic wi and I'll come into room for physical exam component,thanks. Patient already made aware and said was agreeable,\" per dr duenas  "

## 2021-06-14 NOTE — TELEPHONE ENCOUNTER
Nephrology note from December 2020 appreciated.    As discussed on last visit, if patient desires we can start Imuran/azathioprine.    If interested, please prescribe 50 mg daily for 1 week and if well tolerated increase dose to 50 mg twice a day, remaining on this dose.    Check creatinine, AST/ALT, albumin, CBC with differential approximately 4 to 6 weeks after initiating. If stable 8 to 10 weeks thereafter.  Then if stable every 12 weeks.    Maintain follow-up reassessment appointment with us on January 5, 2020.    Continue following up with directives provided by nephrology.    Please place orders.

## 2021-06-14 NOTE — PROGRESS NOTES
ASSESSMENT & PLAN      Jennyfer was seen today for medication refill and flu vaccine.    Diagnoses and all orders for this visit:    Major depressive disorder, recurrent episode  -     citalopram (CELEXA) 20 MG tablet; Take 1.5 tablets (30 mg total) by mouth daily.  -     Ambulatory referral to Psychology  -     NMR with Lipid  -     Comprehensive Metabolic Panel  -     HM1(CBC and Differential)  -     Vitamin D, Total (25-Hydroxy)  -     Thyroid Cascade  -     Vitamin B12  -     HM1 (CBC with Diff)  -     Thyroid Peroxidase Antibody; Future  -     T3 (Triiodothyronine), Reverse    Generalized anxiety disorder  -     citalopram (CELEXA) 20 MG tablet; Take 1.5 tablets (30 mg total) by mouth daily.  -     Thyroid Peroxidase Antibody; Future  -     T3 (Triiodothyronine), Reverse    Screening for cholesterol level  -     NMR with Lipid    Other orders  -     Influenza, Seasonal Quad, Preservative Free 36+ Months        Return in about 3 months (around 2/13/2018).           CHIEF COMPLAINT: Jennyfer Terrazas had concerns including Medication Refill and Flu Vaccine.    [place]: 1.............. had concerns including Medication Refill and Flu Vaccine.    1. Major depressive disorder, recurrent episode    2. Generalized anxiety disorder    3. Screening for cholesterol level      No problem-specific Assessment & Plan notes found for this encounter.      CC:         Going depression symptoms PHQ 9 today still moderate score  No nausea, lightheadedness, loose stools from the medication  No other concerns today came in because she was asked to come in  Does have trouble shutting down in the evening and getting to sleep  She is employed   relates in a good relationship  Is an 8-year-old daughter at home  Allergies codeine  Most of [facility] pharmacy  No tobacco use no alcohol use      PHQ 9 reviewed  Denies any libido issues or sexual dysfunction with the medication        Any associated Sx to above complaint: No other review of  systems concerns       today points for feeling tired no energy overeating were 3s  Trouble falling asleep staying asleep 2  Trouble concentrating 2      SUBJECTIVE:  Jennyfer Terrazas is a 42 y.o. female    No past medical history on file.  Past Surgical History:   Procedure Laterality Date     SD MYOMECTOMY 1-4,W/TOT 250GMS/<,ABD APPRCH      Description: Uterine Myomectomy;  Recorded: 05/05/2014;     UTERINE FIBROID SURGERY       Acetaminophen-codeine and Grass  Current Outpatient Prescriptions   Medication Sig Dispense Refill     albuterol (VENTOLIN HFA) 90 mcg/actuation inhaler Inhale 2 puffs every 6 (six) hours as needed for wheezing. 1 Inhaler 12     citalopram (CELEXA) 20 MG tablet Take 1.5 tablets (30 mg total) by mouth daily. 135 tablet 1     ibuprofen (ADVIL,MOTRIN) 200 MG tablet Take 200 mg by mouth as needed for pain.       No current facility-administered medications for this visit.      Family History   Problem Relation Age of Onset     Arthritis Mother      Cancer Mother      Depression Mother      Hearing loss Mother      Vision loss Mother      Cancer Father      Hearing loss Father      Asthma Paternal Aunt      Diabetes Maternal Grandfather      Hypertension Maternal Grandfather      Arthritis Paternal Grandmother      Heart disease Paternal Grandmother      Hypertension Paternal Grandmother      Kidney disease Paternal Grandmother      Mental illness Paternal Grandmother      Alcohol abuse Paternal Grandfather      Arthritis Paternal Grandfather      Asthma Paternal Grandfather      Clotting disorder Paternal Grandfather      Dementia Paternal Grandfather      Depression Paternal Grandfather      Social History     Social History     Marital status:      Spouse name: N/A     Number of children: N/A     Years of education: N/A     Social History Main Topics     Smoking status: Never Smoker     Smokeless tobacco: Never Used     Alcohol use No     Drug use: No     Sexual activity: Not  "Currently     Other Topics Concern     None     Social History Narrative     Patient Active Problem List   Diagnosis     Major Depression, Recurrent     Anemia     Generalized Anxiety Disorder     Lower resp. tract infection                                              SOCIAL: She  reports that she has never smoked. She has never used smokeless tobacco. She reports that she does not drink alcohol or use illicit drugs.    REVIEW OF SYSTEMS:   Family history not pertinent to chief complaint or presenting problem    Review of systems otherwise negative as requested from patient, except   Those positive ROS outlined and discussed in [place].    OBJECTIVE:  /62 (Patient Site: Left Arm, Patient Position: Sitting, Cuff Size: Adult Large)  Pulse 68  Ht 5' 1.1\" (1.552 m)  Wt 156 lb (70.8 kg)  BMI 29.38 kg/m2    GENERAL:     No acute distress.   Alert and oriented X 3         Physical:    Likely flat affect  Lungs are clear  Cardiac S1-S2 regular sinus appreciable murmur  No tremulousness  Well-groomed        ASSESSMENT & PLAN      Jennyfer was seen today for medication refill and flu vaccine.    Diagnoses and all orders for this visit:    Major depressive disorder, recurrent episode  -     citalopram (CELEXA) 20 MG tablet; Take 1.5 tablets (30 mg total) by mouth daily.  -     Ambulatory referral to Psychology  -     NMR with Lipid  -     Comprehensive Metabolic Panel  -     HM1(CBC and Differential)  -     Vitamin D, Total (25-Hydroxy)  -     Thyroid Cascade  -     Vitamin B12  -     HM1 (CBC with Diff)  -     Thyroid Peroxidase Antibody; Future  -     T3 (Triiodothyronine), Reverse    Generalized anxiety disorder  -     citalopram (CELEXA) 20 MG tablet; Take 1.5 tablets (30 mg total) by mouth daily.  -     Thyroid Peroxidase Antibody; Future  -     T3 (Triiodothyronine), Reverse    Screening for cholesterol level  -     NMR with Lipid    Other orders  -     Influenza, Seasonal Quad, Preservative Free 36+ Months      We " talked about striving for regular physical activity  Restorative sleep  As well as good nutritious food      Return in about 3 months (around 2/13/2018).       Anticipatory Guidance and Symptomatic Cares Discussed   Advised to call back directly if there are further questions, or if these symptoms fail to improve as anticipated or worsen.  Return to clinic if patient has a clinical concern that warrants an exam.        25 Min Total Time, > 50% counseling and coordination of Care    Jared Rainey MD  Family Medicine   [facility] [id number] [phone]

## 2021-06-14 NOTE — PATIENT INSTRUCTIONS - HE
Summary of Your Rheumatology Visit    Next Appointment:  3 Months    Medications:    Please follow directives on pill bottle on how to take medication(s) provided.      Referrals:      Tests:     Please have labs performed in about 5 weeks.    Please have x-rays that were ordered performed.        Injections:        Other:

## 2021-06-14 NOTE — PROGRESS NOTES
Jennyfer Terrazas who presents today with a chief complaint of  f/u and injection (fingers, ankles, elbows)      Joint Pains: Yes  Location:   Onset:  Intensity: 2 /10  AM Stiffness: Minutes  Alleviating/Aggravating Factors:  Tolerating Meds:  Other:      ROS:  Patient denies having any chest pain, shortness of breath, cough, abdominal pain, nausea, vomiting, rashes, fevers, oral ulcers and recent infections.  Patient admits to having a good appetite      Problem List:  Patient Active Problem List   Diagnosis     Major Depression, Recurrent     Anemia     Generalized Anxiety Disorder     Lower resp. tract infection     Vitamin B12 deficiency (non anemic)     Vitamin D deficiency     Anti-TPO antibodies present     Hypokalemia     Non-traumatic rhabdomyolysis     Metabolic acidosis     Generalized muscle weakness        PMH:   Past Medical History:   Diagnosis Date     Anxiety      Depression        Surgical History:  Past Surgical History:   Procedure Laterality Date     ND MYOMECTOMY 1-4,W/TOT 250GMS/<,ABD APPRCH      Description: Uterine Myomectomy;  Recorded: 05/05/2014;     UTERINE FIBROID SURGERY         Family History:  Family History   Problem Relation Age of Onset     Arthritis Mother      Cancer Mother      Depression Mother      Hearing loss Mother      Vision loss Mother      Cancer Father         leukemia      Hearing loss Father      Asthma Paternal Aunt      Diabetes Maternal Grandfather      Hypertension Maternal Grandfather      Arthritis Paternal Grandmother      Heart disease Paternal Grandmother      Hypertension Paternal Grandmother      Kidney disease Paternal Grandmother      Mental illness Paternal Grandmother      Alcohol abuse Paternal Grandfather      Arthritis Paternal Grandfather      Asthma Paternal Grandfather      Clotting disorder Paternal Grandfather      Dementia Paternal Grandfather      Depression Paternal Grandfather        Social History:   reports that she has never smoked. She has  never used smokeless tobacco. She reports that she does not drink alcohol or use drugs.    Allergies:  Allergies   Allergen Reactions     Codeine Other (See Comments)     Tylenol 3, stomach pains that resulted in ER visit     Grass         Current Medications:  Current Outpatient Medications   Medication Sig Dispense Refill     azaTHIOprine (IMURAN) 50 mg tablet Take 1 tab po daily for 1 week and if well tolerated increase dose to 50 mg twice a day, remaining on this dose. 60 tablet 1     caffeine (FOR VIVARIN) 200 mg Tab Take 200 mg by mouth.       citalopram (CELEXA) 20 MG tablet Take 20 mg by mouth every evening.       mv-mn/iron fum/FA/omega3,6,9#3 (WOMEN'S MULTI ORAL) Take 1 tablet by mouth daily.       potassium chloride (KLOR-CON) 20 mEq packet Take 20 mEq by mouth 2 (two) times a day. 60 packet 0     sodium bicarbonate 650 MG tablet Take 2 tablets (1,300 mg total) by mouth 2 (two) times a day. OTC product 120 tablet 0     No current facility-administered medications for this visit.            Physical Exam:  /70   Pulse 72   Ht 5' (1.524 m)   Wt 153 lb (69.4 kg)   SpO2 98%   BMI 29.88 kg/m    General: A & O x 3 in NAD  HEENT: EOMI, Non injected/non icteric sclera  CV: s1s2 with RRR, no rubs appreciated   Lungs: CTA B/L, no wheezing , rales or rhonci appreciated  GI: Soft, NT/ND, no rebound, no guarding noted  MS: Tenderness noted involving the right third DIP, right first through fifth MCPs and right third through fifth PIPs with some questionable subtle fullness particularly over PIPs noted.  Hypertrophic changes with mild subluxation noted involving right third DIP.  Mild discomfort involving right trochanteric bursa region/lateral hip area on passive range of motion testing of right hip and focal palpation.  Otherwise patient demonstrated good passive/active ROM over other joints with no warmth, erythema, tenderness or synovitis noted over these joints.    Summary/Assessment:    History that  includes Sjogren's presenting with RTA type II, multiple joint pains.    Presents for follow-up visit.    States overall feeling better less fatigue, less muscle aches.    Saw nephrology about a week ago and told progressing well.  Does not have follow-up with nephrology at this time.    States started Imuran about 6 days ago.    On exam noted to have some tenderness with some subtle fullness involving right third through fifth PIPs, consistent with having inflammatory arthritis perhaps Sjogren's related.  May also have DJD component given mild hypertrophic changes.  Admits to typing over many years.    Right third DIP also tender with mild hypertrophic changes and subluxation noted.  Patient also tender over first through fifth MCPs.    Has some right lateral hip pains likely secondary to trochanteric bursitis.    Elevated IgA cardiolipin antibody noted.    Potassium and CK level from about 3 to 4 weeks ago was within normal limits.  CO2 a bit low.    Please see below for management plan.      From prior note: Presented on initial visit with after being discharged from hospital in October 2020.    Patient explains that she developed weakness, fatigue and muscle aches a few days to a week after a longer than typical hike.  In the emergency room she was noted to have a low potassium, low bicarb and metabolic acidosis.  Patient was seen by nephrology.  Work-up included some autoimmune markers and was noted to have elevated SSA/SSB antibodies.  Patient diagnosed with type II RTA.  Has been on potassium supplements and the potassium level has improved/remained stable since.  Last checked October 20, 2020.  Presented to the ER on October 12, 2020.  Has never experienced similar episode in the past.    Has some dry mouth, denies having any dry eyes, dry skin or vaginal dryness.    States currently doing well just some mild weakness compared to baseline, not experiencing significant weakness, fatigue and muscle  aches.    Has occasional joint pains involving fingers, wrists, elbows and ankles.  Hand pains mainly over PIPs.  On video exam no clear signs of synovitis appreciated involving hands and wrists.    States did not receiving steroids in the hospital.    Has a follow-up appointment with neurology towards the end of December 2020.        Pertinent rheumatology/past medical history (please refer to above for more detailed history):      Type II renal tubular acidosis (presented with hypokalemia, low bicarb, metabolic acidosis, likely secondary to Sjogren's, positive SSA/SSB antibodies.    Chronic multiple joint pains (hands, wrist, elbows ankles)    Inflammatory arthritis (right hand, subtle particularly third through fifth PIPs)    Hand pains, right greater than left    Right trochanteric bursitis    Microalbuminuria    Elevated IgG anticardiolipin antibody        Rheumatology medications provided/suggested:    Tylenol    Pertinent medication from other providers or from otc (please refer to above for more detailed med list):    Potassium 20 mEq  Sodium bicarb      Pertinent medications already tried:     Advil      Pertinent lab history:    Had: Low potassium and     Low: CO2    Elevated/positive: ERVIN, SSA/SSB antibodies, IgA cardiolipin antibody, CK (lately normal), urine microalbumin    Negative/unremarkable: Rheumatoid factor, CCP antibody, other ERVIN related subsets, lupus anticoagulant, creatinine, serum immunofixation, glucose, TSH.    Normal TPMT level.    Pertinent imaging/test history:          Other:  , 1 child, working: [email or web address], no alcohol beverage, none smoker,  No recreational drug used.    States weighs 148 pounds.    Plan:      Continue Imuran 50 mg twice a day.    We will add prednisone taper starting 15 mg decreasing to off over the course of 4 weeks for inflammatory arthritis involving right hand.    Continue following through with recommendations provided by nephrology.    Avoid Advil, all  NSAIDs.    For pain relief can take Tylenol 500-1000 mg twice daily as needed.    Already made aware if experiences similar episode of muscle aches, weakness, fatigue recommend she go to the emergency room.    Avoid rigorous activities, long hikes for now.    Recommend drinking sufficient amount of fluids daily (8 cups of 8 ounces).    Will obtain x-rays of bilateral hands and right hip/pelvis.    Deferred right trochanteric bursa cortisone injection.    Check labs in about 5 weeks.  We will repeat cardiolipin antibodies in greater than 3 months.    Follow-up in 3 months.      Procedure note:           This note was transcribed using Dragon voice recognition software as a result unintentional grammatical errors or word substitutions may have occurred. Please contact our Rheumatology department if you need any clarification or if you have any related inquiries.    Major side effect profile of medications provided were discussed with the patient.      Placido Herbert ....................  1/11/2021   10:08 AM

## 2021-06-14 NOTE — TELEPHONE ENCOUNTER
Nephrology notes in chart 12/21/20, please advise if pt is start Imuran now or wait until her follow up appt on 1/5/21

## 2021-06-15 ENCOUNTER — RECORDS - HEALTHEAST (OUTPATIENT)
Dept: RHEUMATOLOGY | Facility: CLINIC | Age: 46
End: 2021-06-15

## 2021-06-15 DIAGNOSIS — N25.89 TYPE II RTA: ICD-10-CM

## 2021-06-15 DIAGNOSIS — M35.04 SJOGREN'S SYNDROME WITH TUBULO-INTERSTITIAL NEPHROPATHY (H): ICD-10-CM

## 2021-06-15 DIAGNOSIS — M19.90 INFLAMMATORY ARTHRITIS: ICD-10-CM

## 2021-06-15 NOTE — TELEPHONE ENCOUNTER
RN cannot approve Refill Request    RN can NOT refill this medication med is not covered by policy/route to provider. Last office visit: 2/14/2020 Jared Rainey MD Last Physical: Visit date not found Last MTM visit: Visit date not found Last visit same specialty: 10/20/2020 Jennyfer Daniels MD.  Next visit within 3 mo: Visit date not found  Next physical within 3 mo: Visit date not found      Rosalinda Andersen, [facility] Connection Triage/Med Refill 2/28/2021    Requested Prescriptions   Pending Prescriptions Disp Refills     sodium bicarbonate 650 MG tablet [Pharmacy Med Name: SODIUM BICARB 650 MG TABLET 650 Tablet] 120 tablet 0     Sig: TAKE 2 TABLETS (1,300 MG) BY MOUTH 2 (TWO) TIMES A DAY.       There is no refill protocol information for this order

## 2021-06-15 NOTE — TELEPHONE ENCOUNTER
----- Message from Placido Herbert DO sent at 2/28/2021  5:08 PM CST -----  Repeat cardiolipin IgA antibody returned elevated however it was performed too soon as needed to be rechecked 3 months after initial abnormal result, please notify patient and replace order.    Normal/unremarkable SPEP/UPEP and a basic metabolic panel..    Some improvement of urine microalbumin/creatinine ratio noted, recommend periodic monitoring.

## 2021-06-16 PROBLEM — E55.9 VITAMIN D DEFICIENCY: Status: ACTIVE | Noted: 2019-02-19

## 2021-06-16 PROBLEM — E53.8 VITAMIN B12 DEFICIENCY (NON ANEMIC): Status: ACTIVE | Noted: 2019-02-19

## 2021-06-16 PROBLEM — E87.6 HYPOKALEMIA: Status: ACTIVE | Noted: 2020-10-12

## 2021-06-16 PROBLEM — R76.8 ANTI-TPO ANTIBODIES PRESENT: Status: ACTIVE | Noted: 2019-02-19

## 2021-06-16 NOTE — PROGRESS NOTES
SUBJECTIVE       Jennyfer Terrazas is a 45 y.o.  female with a PMH significant for:     Patient Active Problem List   Diagnosis     Major Depression, Recurrent     Anemia     Generalized Anxiety Disorder     Lower resp. tract infection     Vitamin B12 deficiency (non anemic)     Vitamin D deficiency     Anti-TPO antibodies present     Hypokalemia     Non-traumatic rhabdomyolysis     Metabolic acidosis     Generalized muscle weakness     She presents for refill of citalopram.    Patient usually sees Dr. Rainey.  Patient is currently taking citalopram for depression.  Patient notes her depression has been well controlled.  Patient's been on this medication for years.  Patient denies any negative side effects.  Patient has been out of this medication for the past 3 days.  Patient has also utilize psychology in the past.  She has not seen her psychologist for the last year due to Covid.  Patient has noticed an increase in her anxiety associated with Covid.  Patient is not overly concerned with this.    She denies any other acute questions or concerns today.    PMH, Medications and Allergies were reviewed and updated as needed.        REVIEW OF SYSTEMS     Pertinent items are noted in HPI.        OBJECTIVE     Vitals:    04/07/21 0855   BP: 130/70   Patient Site: Left Arm   Patient Position: Sitting   Cuff Size: Adult Regular   Pulse: (!) 59   Temp: 97.7  F (36.5  C)   TempSrc: Oral   SpO2: 99%   Weight: 162 lb 3 oz (73.6 kg)   Height: 5' (1.524 m)     Body mass index is 31.68 kg/m .    Constitutional: Awake, alert, cooperative, no apparent distress, and appears stated age.  Eyes: Lids and lashes normal, sclera clear, conjunctiva normal.  ENT: Normocephalic, without obvious abnormality, atramatic,   Abdomen: Normal bowel sounds, soft, non-distended, non-tender, no masses palpated, no hepatosplenomegally.  Musculoskeletal: No redness, warmth, or swelling of the joints.  Full range of motion noted.   Neurologic: Awake,  alert, oriented to name, place and time.  Cranial nerves II-XII are grossly intact and gait is normal.    EKG per my read:   Bradycardia, normal axis, low R wave progression, normal QTC    ASSESSMENT AND PLAN     Jennyfer was seen today for medication check.    Diagnoses and all orders for this visit:    Recurrent major depressive disorder, in full remission (H): Provided refill of medication.  -     citalopram (CELEXA) 20 MG tablet; Take 1 tablet (20 mg total) by mouth every evening.    Encounter for therapeutic drug monitoring: Patient recently started on hydroxychloroquine.  This can be QT prolonging in addition to citalopram.  EKG shows normal QTC.  -     Electrocardiogram Perform and Read    RTC for routine wellness visit or sooner if develops new or worsening symptoms.    Ramona Mar DO

## 2021-06-16 NOTE — TELEPHONE ENCOUNTER
RN cannot approve Refill Request    RN can NOT refill this medication historical medication requested. Last office visit: 2/14/2020 Jared Rainey MD Last Physical: Visit date not found Last MTM visit: Visit date not found Last visit same specialty: 10/20/2020 Jennyfer Daniels MD.  Next visit within 3 mo: Visit date not found  Next physical within 3 mo: Visit date not found      Charlie Whitney, [facility] Connection Triage/Med Refill 4/2/2021    Requested Prescriptions   Pending Prescriptions Disp Refills     citalopram (CELEXA) 20 MG tablet [Pharmacy Med Name: CITALOPRAM 20 MG TABLET 20 Tablet] 90 tablet 3     Sig: TAKE 1 TABLET (20 MG TOTAL) BY MOUTH DAILY.       [facility] Refill Protocol  Passed - 3/31/2021  5:40 PM        Passed - PCP or prescribing provider visit in last year     Last office visit with prescriber/PCP: 2/14/2020 Jared Rainey MD OR same dept: Visit date not found OR same specialty: 10/20/2020 Jennyfer Daniels MD  Last physical: Visit date not found Last MTM visit: Visit date not found   Next visit within 3 mo: Visit date not found  Next physical within 3 mo: Visit date not found  Prescriber OR PCP: Jared Rainey MD  Last diagnosis associated with med order: There are no diagnoses linked to this encounter.  If protocol passes may refill for 12 months if within 3 months of last provider visit (or a total of 15 months).

## 2021-06-16 NOTE — TELEPHONE ENCOUNTER
Left message for pt to call back    Pt LFTs are elevated, discussed lab results with Dr Herbert via phone. Dr Herbert recommends pt stop azathioprine. He would like pt to have labs rechecked in one week- liver panel, CK, CMP and GGT. Pt to follow up with PCP next week as Dr Herbert is out of office, urgent referral to GI- can cancel if numbers stabilize. Pt should be seen in ER or by PCP if she is not feeling week.     Avoid alcohol and Tylenol.

## 2021-06-16 NOTE — TELEPHONE ENCOUNTER
Dr Herbert notified via phone of pts lab results from 4/1, LFTs have improved. GGT is elevated. He recommends pt not resume Imuran, pt can instead try hydroxychloroquine 200mg two times a day and have labs rechecked again in 3 weeks and keep appt scheduled on 5/11 for follow up.     Pt notified of lab results and starting hydroxychloroquine, pt agrees with plan and will have labs in 3 weeks. Pt states she feels much better this week compared to last week. Pt will let us know if she starts feeling poor again or has any concerns after starting hydroxychloroquine. HCQ rx sent to pharmacy per Dr Herbert.     Lab orders entered per Dr Herbert

## 2021-06-16 NOTE — TELEPHONE ENCOUNTER
Please call Jennyfer and let her know that there is an interaction between Citalopram and Hydroxychloroquine  We should choose a different antidepressant    THis  Is why I am refusing her Celexa    Pristiq does NOT interact    DanL

## 2021-06-16 NOTE — TELEPHONE ENCOUNTER
Reason for Call:  Medication or medication refill:    citalopram (CELEXA) 20 MG tablet    Do you use a [facility]?  Name of the pharmacy and phone number for the current request: [facility]    Name of the medication requested: citalopram (CELEXA) 20 MG tablet    Other request: Pt is currently out of medication. She did send in a refill request mychart note on 4/2/21.    Pt is scheduled with Dr. Mar on 4/7/21.    Can we leave a detailed message on this number? No call back needed    Phone number patient can be reached at:   Cell number on file:    Telephone Information:   Mobile [phone]       Best Time:     Call taken on 4/5/2021 at 12:24 PM by Mima Mabry

## 2021-06-16 NOTE — TELEPHONE ENCOUNTER
Pt notified of lab results, pt will stop the Imuran, pt states she does not drink alcohol, very rarely if at all, she does use Tylenol occasionally, she will not use this for now due to elevated labs. Pt denies starting any other new meds, med list is up to date.     Pt states she feels okay over all, has some back pain off and on, no pain or discomfort in abdomen. Pt states she feels more tired sometimes. Pt instructed that if her symptoms change or get worse, she should be seen by PCP or go to the ER.     Lab appt scheduled for 4/1, lab orders in chart. Will send message to Dr Rainey as well.     GI referral entered in chart per Dr Herbert.

## 2021-06-17 NOTE — TELEPHONE ENCOUNTER
Refill Approved    Rx renewed per Medication Renewal Policy. Medication was last renewed on 11/20/20.    Charlie Whitney, [facility] Connection Triage/Med Refill 5/20/2021     Requested Prescriptions   Pending Prescriptions Disp Refills     potassium chloride (KLOR-CON) 20 mEq packet 60 packet 0     Sig: Take 20 mEq by mouth 2 (two) times a day.       Potassium Supplements Refill Protocol Passed - 5/19/2021  7:55 PM        Passed - PCP or prescribing provider visit in past 12 months       Last office visit with prescriber/PCP: Visit date not found OR same dept: 10/20/2020 Jennyfer Daniels MD OR same specialty: 4/7/2021 Ramona Mar DO  Last physical: Visit date not found Last MTM visit: Visit date not found   Next visit within 3 mo: Visit date not found  Next physical within 3 mo: Visit date not found  Prescriber OR PCP: Young Sepulveda MD  Last diagnosis associated with med order: 1. Hypokalemia  - potassium chloride (KLOR-CON) 20 mEq packet; Take 20 mEq by mouth 2 (two) times a day.  Dispense: 60 packet; Refill: 0    If protocol passes may refill for 12 months if within 3 months of last provider visit (or a total of 15 months).             Passed - Potassium level in last 12 months     Lab Results   Component Value Date    Potassium 4.7 04/23/2021    Potassium, Urine 38.2 10/13/2020

## 2021-06-17 NOTE — TELEPHONE ENCOUNTER
Please discuss with the patient:    Given history of positive IgM cardiolipin antibodies x1, negative cardiolipin IgG antibodies. positive cardiolipin IgA antibodies x2 (IgA being less significant).  Recommend repeating cardiolipin antibodies along with beta-2 glycoprotein antibodies with next set of standing order labs in about 6 weeks.    Please place lab orders mentioned above.

## 2021-06-17 NOTE — TELEPHONE ENCOUNTER
Last ov-1/11/21  Last labs-4/23/21    Future appt-5/11/21    Pt started HCQ early April after stopping Imuran due to elevated LFTs, any recommendations for nausea symptoms?

## 2021-06-17 NOTE — TELEPHONE ENCOUNTER
Recommend having a baseline chest x-ray prior to initiating methotrexate.    If chest x-ray is unremarkable, can add methotrexate 4 tablets weekly for 2 weeks thereafter if well tolerated increase to 6 tablets weekly, remain on this dose.      Please also review the following with the patient: Avoid alcoholic beverages while on methotrexate.  Hold methotrexate if develops any infection or if on antibiotics, can restart once infection clears.      While on methotrexate one should not become pregnant, as it is teratogenic (can cause fetal deformities).  Therefore, if wishes to become pregnant, she should contact/inform us and  hold this medication 3-4 months prior to attempting to conceive].    While on methotrexate, we will add folic acid 1 mg to be taken daily, except the day when taking methotrexate.    Hold methotrexate at least 1 week prior to any surgery or extensive dental work, can restart 1 week thereafter if no complications and cleared by involved provider.     Similarly hold methotrexate for 1 week prior to receiving nonlive vaccine, can restart 3 to 7 days post vaccine if no complications.  Should avoid live vaccines.    Check standing order labs approximately 5 weeks after initiating methotrexate, thereafter if stable every 8-12 weeks.      If not already received, please send patient a handout on methotrexate.    If no longer taking Plaquenil, please remove from med list.    Recommend scheduling a follow-up reassessment within 2 months.

## 2021-06-17 NOTE — TELEPHONE ENCOUNTER
RN cannot approve Refill Request    RN can NOT refill this medication med is not covered by policy/route to provider. Last office visit: 2/14/2020 Jared Rainey MD Last Physical: Visit date not found Last MTM visit: Visit date not found Last visit same specialty: 4/7/2021 Ramona Mar, DO.  Next visit within 3 mo: Visit date not found  Next physical within 3 mo: Visit date not found      Rosalinda Andersen, [facility] Connection Triage/Med Refill 5/19/2021    Requested Prescriptions   Pending Prescriptions Disp Refills     sodium bicarbonate 650 MG tablet 120 tablet 0     Sig: OTC product       There is no refill protocol information for this order

## 2021-06-17 NOTE — PROGRESS NOTES
"Diagnostic Assessment  [] Brief  [x] Standard    Date(s): 2018  Start Time: 8:06 AM  Stop Time: 9:48 AM    Patient Name: Jennyfer Terrazas  Age: 42 y.o.    1975        Referral Source: Jared Rainey MD  Therapist: FLAVIA Dove        Persons Present: Jennyfer Terrazas and FLAVIA Dove    Chief Complaint   \"Dr. Rainey said I should try trying seeing someone.\"      Patient s expectation for treatment   \"Less stress - more able to do things I need to get done.\"      Sources/references used in completing this assessment:   Face-to-face interview, review of patient's chart, adult intake questionnaire, psychological measures listed below:      Psychological Measures:  1. PANSI: Positive ideation score=3.5 <3.4; Negative ideation score= 1>1.6.  Patient denies suicidal thoughts and/or planning and commits to seeking safety if her is unsafe in the community.    2. CAGE Aid= score of 0/4  Patient is not enrolled in chemical dependency program and denies substance use problem; no referral made at this time.  3.  WHODAS: 14, representing a disability percentage of 29.17%.  4. PHQ-9=score of 15 Patient indicates it is somewhat difficult to manage symptoms.  5. NIKITA-7=score of 15 Patient indicates it is somewhat difficult to manage symptoms.    Presenting Problem/History:    Functional Impairments:   Personal: 2  Family: 2  Work: 2  Social:3     How does the presenting problem affect patients daily functioning:    \"I can't sleep at night, I wake up at night and have to go check on my daughter - and, she's fine.\"  She identified concentration difficulty and worry \"about things I shouldn't worry about.\"  She also identified an occasional panic feeling that catches her off guard.      Issues/Stressors:   She mentioned her mother's death which was 5 years ago last December.  Of note, she was referred to therapy at that time after a visit with her PCP.  She acknowledged that the holidays are a " "difficult time, as it was on a [place] 6 years ago that they learned that her mother had late stage cancer.  She also identified her daughter's autism and ADD as stressful, while noting that her \"school is helping a lot, and she is on ritalin.\"  She considered that occasionally her work is a source of stress as she is in a role of customer service.  Also, her  works a lot.    Physical Problems: Dizzines, Abdominal pain, Nausea/Vomiting, Headaches, Numbness, Weight gain, Inability to sleep, Sleeping too much, Decreased energy and Increased appitite    Social Problems: Distrust of others, No close friends and Loss of interest in activities    Behavioral Problems: Reported none.    Cognitive Problems: Distractability, Poor attention, Indecisiveness, Poor memory, Forgetful, Procrastination, Learning disability she recalls being evaluated for attentional/hyperactivity symptoms as a child.  She considers that she may have had ADD, and reported benefitting form some educational support during elementary school. and Worries    Emotional Problems: Anxious, Depressed mood, Feelings of guilt and Lack of self confidence     Onset/Frequency/Duration presenting problem symptoms:    2011    How does the patient perceive his/her problem in relation to how others see his/her problem?  \"I don't think anyone notices.\"      Family/Social History:     Marriages/Significant other  She has been  for 15 years.  She described having a close relationship with her .    Children  A daughter, age 9.  Her daughter has ADHD and is on the autism spectrum.  She is connected to support resources at school.    Parents   Her parents are both .  They were  for 36 years.  Her mother  in  of late diagnosed cancer and her father  of leukemia a few years prior to her mother's death.      Siblings   She has one brother who is 11 months younger than her.  He lives in [place] with his wife and two " "children.      Climate in family of origin   \"Dad was a weekend alcoholic, but always made sure we got what we needed and went on vacations.\"    Education   Graduated high school.  Attended one semester of college, and returned home.  She stated, \"I didn't like it.  I think I was getting too homesick.\"     Problems with Learning or School  She was evaluated for \"some sort of hyperactivity\" while in elementary school and was on ritalin briefly.  Her mother did not like the effect it had on her.  She described having a special  for [employer] who made a big difference for her with school.    Developmental factors   She reported that she met her developmental milestones.    Significant personal relationships including patient s evaluation of the relationship quality  She identified her  as her primary support/close relationship.  She mentioned that she has some friends at work.   She indicated she had a best friend for many years who she was very close to, \"she was like a sister.\"  She recalled her friend becoming mad at her when she got , and their relationship ended.  She considered that this loss had a significant effect on her and had led her to have a hard time trusting.      Significant life events   She reflected on the birth of her daughter was was born over 6 weeks early.  She stated, \"That was the best day of my life and she was born on Easter Sunday.\"  She also reflected on her wedding, describing it as \"awesome\" and \"[place]\" [place].    Sexual/physical/emotional/financial abuse/trauma  Reported none.    Contextual Non-personal factors contributing to the patients concerns  Reported none.    Strengths/personal resources  Crafts, cooking, reading.    Weaknesses   She identified trait of shyness, and considered that she has difficulty talking to people.  She also reported that she tends to be distrustful of others.      Support network(s)/Resources   Reported no " "formal support networks/resources.    Belief system:    J[employer]ism.    Cultural influences and impact on patient   Her father was Serbian-American and her mother [place] citizen-American.  She identified a tendency in her father to de-emphasize his Serbian heritage.  She considered that his father discouraged the speaking of English toward goals of greater cultural assimilation and in response to the racist attitudes of growing up in the 40's and 50's in [place].  She also identified the influence of her father's substance abuse and her family's joselyn as shaping influences.  Of her father's alcoholism, she stated, \"I figured out when I was 6. He was in his bed moaning and I thought he was dying. My mom told me he was an alcoholic.\"  She remembered being relieved he was not dying and her response was \"I prayed very hard for him.\"  He did begin to reduce his drinking and limited it to weekends.  She also identified a value transmuted from her father of \"family is everything.\"  He often took her, her sibling and her cousins on camping trips.  He was also at every recital she and her brother had.      Cultural impact on health and health care  Seeks and adheres to standard, western medical care.    Current living situation   Lives in her home with her  and daughter.      Work History  History of stable employment. Has worked at a [employer] for the last 14 years.    Financial Concerns   Reported none.    Legal Problems  Reported none.    Hobbies/Interests:    Reading, crafts, spending time with her daughter.        Family Mental Health/Medical History    Family Mental Health:    Depression - paternal aunt.      Family history of Suicide:  Two cousins, a father and son.  They killed themselves 15 years apart.  She reported both of them had substance abuse problems.      Family history Chemical Dependency:    Alcoholism - father, paternal uncle, paternal male cousins.    Family Medical history: "   Cancer - mother (type unknown).  Cancer (leukemia) - father.  Emphysema - paternal uncle. Alzheimer's disease - two paternal aunts (one in her 70's and one in her 50's).      Patient Medical History    Hospitalizations    Reported spending one night at [facility] due to low hemoglobin in 2003 when she received a blood transfusion.  One of her physicians recommended she be treated for fibroids.  She later had outpatient fibroid removal surgery at [facility] in [place].      Medical diagnoses/concerns:  Patient Active Problem List   Diagnosis     Major Depression, Recurrent     Anemia     Generalized Anxiety Disorder     Lower resp. tract infection       Current physician/other non psychiatric medical provider s:    Jared Rainey MD                     Date of last medical exam:   11/13/18    Current Medications:  Current Outpatient Prescriptions   Medication Sig Dispense Refill     citalopram (CELEXA) 20 MG tablet Take 1.5 tablets (30 mg total) by mouth daily. 135 tablet 1     ibuprofen (ADVIL,MOTRIN) 200 MG tablet Take 200 mg by mouth as needed for pain.       No current facility-administered medications for this visit.          Past Mental Health History:    Previous mental health diagnosis:  Depression and anxiety symptoms.    Date of diagnosis:  She is uncertain exactly the date, possibly in 2012.  Her former PCP, Dr. Dale prescribed citalopram for her and these notes were reviewed in epic.  She indicated that she has found it helpful to take citalopram.      Hx of Mental Health Treatment or Services:  Reported none.      ROBBIE Received:      [] Yes   [x] No      Hx of MH Tx/Hospitalizations  Reported none.    Hx of Psychiatric Medications:  Reported none.      Suicidal/Homicidal Risk Assessment:    Suicidal: None reported  Ideation:Reported none.  History of Past Attempt(s): description: Reported none.  Crisis Plan: Crisis plan not developed as patient denies symptoms.    Homicidal: None  "reported   Ideation:Reported none.  History of Aggression towards others: Reported none.  Crisis Plan: Crisis plan not developed as patient denies symptoms.    Self-Injuring Behaviors: Reported none.  History of SIB: Reported none.  Crisis Plan: Crisis plan not developed as patient denies symptoms.    History of destruction to property: Reported none.  Description: Reported none.  Crisis Plan:Crisis plan not developed as patient denies symptoms.      Non- Substance Abuse addictive Behaviors/Compulsive Behaviors:  [] Gambling     [] Sex     [] Pornography    [x] Shopping     [x] Eating     [] Self-Injury  [] Other           [] None Reported      Comments:   Jennyfer considered that she has struggled with eating \"too much junk food\" at times.  She also has occasionally felt some compulsivity in shopping habits.  However, she considers that this is not a problem at this time.        Chemical Use/Abuse History    CAGE-AID (screening to determine a patients use/abuse/dependency):   0/4      Alcohol:   [x] None Reported    [] Yes   [] No  Type:Reported none.  \"Never had a taste for it.\"     Frequency Reported none.           Street Drugs:   [x] None Reported    [] Yes   [] No  Type:Reported no history of use.   Frequency: Reported no history of use.      Prescription Drugs:   [x] None Reported    [] Yes   [] No  Type:Reported no history of abuse.   Frequency: Reported no history of abuse.    Tobacco:   [x] None Reported    [] Yes   [] No  Type:Reported no history of use/abuse.   Frequency: Reported no history of use/abuse.    Caffeine:   [] None Reported    [x] Yes   [] No  Type: coke   Frequency: daily, 1-2 glasses.  Age of first use: 16    Date of last use: yesterday    Currently in a treatment program:   [] Yes   [x] No      Where: Reported no history of treatment/substance abuse problems.    ROBBIE Received:    [] Yes   [x] No         Collaborative info requested/received:   [] Yes   [x] No      Comments: Reported " "none.    History of CD Treatment:      [x] None Reported               MENTAL STATUS EVALUATION    Grooming: Well groomed  Attire: Appropriate  Age: Appears Stated  Behavior Towards Examiner: Cooperative  Motor Activity: Within normal   Eye Contact: Appropriate  Mood: Depressed  Affect: Congruent w/content of speech  Speech/Language: Within normal and Soft  Attention: Within normal  Concentration: Within normal  Thought Process: Within normal  Thought Content: Hallucinations: Within noraml  Delusions: Within normal  Orientation: X 3  Memory: No Evidence of Impairment  Judgment: No Evidence of Impairment  Estimated Intelligence: Average  Demonstrated Insight: Adequate  Fund of Knowledge: adequate      Clinical Impressions/Assessment/Recommendations:     Jennyfer Terrazas provided background information via the Adult Intake Questionnaire, psychological measures (scores are documented at the beginning of this DA), and face-to-face interview.  [facility] medical records were consulted to complete this DA.  The patient was referred to this therapist by Jared Rainey MD.      Jennyfer Terrazas is a pleasant,  42 y.o. Other female presenting to therapy for assistance with depression and anxiety symptoms.  The patient advises her desired outcomes of therapy are \"Less stress - more able to do things I need to get done.\" Jennyfer Terrazas indicates her difficulties with depression and anxiety began in 2011.  The patient has attempted to eliminate or manage these problems by experiential avoidance.  The patient reports that her PCP, Dr. Rainey recommended she engage in psychotherapy and she expressed a willingness to see how she may benefit from this process to better manage her mental health concerns.      Social stressors: Prolonged grief of mother and father, limited support system, daughter's behavioral problems, occupational stress, 's long work hours.  Patient presents important strengths: stable employment, " supportive spouse, values being a parent and strong joselyn identity.     Based on the information gathered in this diagnostic assessment, the patient meets criteria for the DSM-5 Diagnosis, Major Depressive Disorder, recurrent, moderate given her experience of the following symptoms present during the same 2-week period and represent a change from previous functioning: depressed mood most of the day, nearly every day, insomnia, fatigue, feelings of inappropriate guilt, and concentration difficulty.  The patient also meets criteria for the DSM-5 diagnosis, Generalized Anxiety Disorder due to excessive anxiety and worry occurring more days than not for at least 6 months, about a number of events or activities, her difficulty controlling the worry, being easily fatigued, difficulty concentrating, and sleep disturbance.  Her symptom experience causes clinically significant distress and is not attributable to the physiological effects of a substance or other medical condition, nor is it better explained by other mental disorders.      Diagnosis:     Major Depressive Disorder, recurrent, moderate  Generalized Anxiety Disorder      It is recommended that the patient begin individual psychotherapy with follow-up appointments scheduled weekly or biweekly.      Jennyfer Terrazas would be best serviced by therapeutic interventions that provide a client centered atmosphere with positive regard.  In addition, the patient may benefit from cognitive behavioral therapies, along with Motivational Interviewing.        Assessment of client resolving presenting mental health concerns:  Ability  [] low     [x] average     [] high  Motivation [] low     [x] average     [] high  Willingness [] low     [x] average     [] high        Initial Therapy Plan    1. Return to therapy to discuss outcome of diagnostic assessment and create a treatment plan.     2. Develop a therapeutic relationship with therapist.     3. Motivational Interviewing to  increase client's therapeutic engagement and evoke motivation to make positive change.      4. CBT and Mindfulness-based therapeutic approaches, such as Acceptance Commitment Therapy for anxiety and depression.      5. Maintain care relationship with PCP with follow up on medication treatment.        Is patient's family involved in the treatment?  [x] No     [] Yes    If yes, How?  Patient expressed an interest in individual treatment sessions.    If no, Why?  If patient expresses an interest in bringing a loved one to her future treatment sessions, her change in preference will be honored.      Therapist s Signature:   Performed and documented by CLOVIS Bates, LICSW.

## 2021-06-17 NOTE — PATIENT INSTRUCTIONS - HE
Patient Instructions by Jared Rainey MD at 2/19/2019 11:00 AM     Author: Jared Rainey MD Service: -- Author Type: Physician    Filed: 2/19/2019 12:18 PM Encounter Date: 2/19/2019 Status: Addendum    : Jared Rainey MD (Physician)    Related Notes: Original Note by Jared Rainey MD (Physician) filed at 2/19/2019 12:16 PM       Follow-through with Breann Sloan        Also Consider CBD oil  Plus CBD is one Online Brand  Yellow Label 15 mg Daily  Other Choices listed below     Sleep  Could try:  At [facility]    Trial  These Natural Meds  Source naturals Night Rest  Ashwaganda 500 mg Twice Daily  Phosphatidyl Serine 500 mg Daily  Magnesium Glycinate or Citrate 500 mg Daily              MINIMIZE OR AVOID STIMULANTS   Avoid alcohol (wine, beer, and hard liquor) within 3 hours of bedtime.   Avoid caffeine-containing beverages or foods after 2 pm; if sensitive to caffeine, avoid it after 12 noon. (These items include Pepsi, Coke, Mountain Dew; tea, coffee, lattes, and chocolate; coffee- or espressocontaining ice creams or desserts). Read the labels of everything you eat and drink!   Avoid Sudafed or other decongestant cold medicines at night.   Some medications may have stimulating effects. Consult your pharmacist and doctor to determine whether any of them might be contributing to sleep problems. Do not discontinue them without permission from your doctor.   Complete any aerobic exercise before 6 pm (or at least 3 hours before bedtime).   2016     [facility] NIGHTTIME TENSION AND ANXIETY SLEEP PLANNING AND PREPARATION     Avoid anxiety-provoking activities close to bedtime.   Avoid watching the news before going to bed.   Avoid reading stimulating, exciting materials in bed.   Avoid paying bills before bed.   Avoid checking your financial reports or the stock market before bedtime.   Avoid arguments before bedtime.   Try to achieve some action plan or resolution of  a discussion or argument before trying to go to sleep.   Avoid repeated negative judgments about the fact that you are unable to sleep.     Use positive self-talk phrases regarding your ability to relax and fall asleep: I can fall asleep. I can relax.     Try writing in your journal any disturbing thoughts that are running through your mind. n Schedule a time within the next few days to deal with whatever is troubling you. If you are having trouble managing your concerns for more than a few weeks, consult your healthcare provider for treatment suggestions or a counseling/therapy referral.     There are many relaxing yoga or stress reducing mindful breathing CDs or DVDs available to help you find a relaxing bedtime ritual that works for you.     Plan your sleep by putting it into your schedule; plan for 8  to 9 hours in bed. n As much as possible, go to sleep and wake up at the same time each day. This will help train your biological clock.     Begin prepping for bedtime 30 minutes before getting in bed. n Avoid getting in bed after 11 pm as late-hour sleep is not as helpful as earlier sleep.     Avoid late afternoon or evening naps.     Avoid naps longer than 45 minutes unless you are sick or quite sleep deprived. n Avoid large meals or spicy foods before bed.     Finish all eating 3 hours prior to going to sleep.     Avoid drinking more than 4-8 ounces of fluid before going to bed.     Take a hot salt/soda aromatherapy bath--raising your body temperature before sleep helps induce sleep. A hot bath also relaxes muscles and reduces tension. Add 1-2 cups Epson salts (magnesium sulfate absorbed through the skin is very relaxing),   to 1 cup baking soda (sodium bicarbonate which is alkalizing to a stressed out acidic body) and 10 drops lavender oil (helps lower cortisol levels).     Version 4 Achieving better sleep can lead to many health improvements. This list of suggestions for better sleep is not meant to be  implemented in its entirety. Instead, pick three to four changes to implement to improve sleep quality.   2016 [facility]    STRATEGIES TO USE WITH TROUBLE FALLING ASLEEP OR STAYING ASLEEP LIGHT, NOISE, TEMPERATURE, AND ENVIRONMENTAL ISSUES BEDDING AND PILLOWS SUPPLEMENTS AND LIGHT THERAPY   Dont stay in bed more than 20-30 minutes trying to fall asleep. Leave your bedroom and go to a relaxing room other than the bedroom and read or do a relaxation technique (e.g., meditation).    Consider reading a good neutral book under low light to help with falling asleep.      If using a tablet or phone for reading, make sure they are in the nighttime setting and brightness is as low as possible.     If using a light, dont use a table lamp. Instead use a HUD light or other small light that only illuminates the reading material.     If you awaken early because of light, put a dark covering over your eyes.     If you awaken early because of recurrent thoughts, try writing them in a journal. If this does not help, consider counseling. Depression might be a factor.     Turn down the light in the bathroom and in rooms you are in 15 minutes before going to bed. n Decrease the light in your bedroom by using a dimmer or a reading light with a dimmer. n     Consider using lucille glasses for at least 30 minutes before bedtime to reduce light exposure. n Use dark window shades or consider a set of eye shades or a black covering for your eyes when trying to sleep or if you awaken too early because of light.     Decrease irritating noises in your space by closing windows, using ear plugs, or using a white noise generator or a HEPA air filter.   Turn off or remove any appliances or clocks that make noise.     Make sure your sleeping area is the correct temperature range (not too hot or too cold).     Avoid sleeping near electromagnetic fields. Try to have your head at least 8 feet away from electromagnetic fields,  if possible. Possible sources of electromagnetic fields include: electrical outlets, clock radios, stereos, cell phones, computers and monitors. Consider moving these devices or moving your bed or your position in the bed. Consider using a Tri Field or other meter to test for these fields.     Avoid sleeping with an electric blanket on. Instead, turn on blanket when prepping for bedtime then turn it off when getting into bed.     Consider replacing your pillows with hypoallergenic pillows. Use ultrafine allergy pillow and mattress covers.     Consider using a side sleeper pillow for under your neck when sleeping on your side n     Consider using a body pillow to hug and put between your knees to align your back and shoulders at night.     Roll backwards at a slight angle onto a body pillow if you have hip bursitis or shoulder pain. n Sleep on the highest quality bed linens you can afford. n Consider taking supplements to aid your sleep.     Sleep Hygiene Rules  Sleep hygiene refers to actions that tend to improve and maintain good sleep     ----Sleep as long as necessary to feel rested (usually seven to eight hours for adults) and then get out of bed  -----Maintain a regular sleep schedule, particularly a regular wake-up time in the morning  -----Try not to force sleep  ----Avoid caffeinated beverages after lunch  Avoid caffeine (after 3 PM) alcohol (after dinner).  Intake of either, particularly later in the day can significantly prescribe sleep cycle.  ----Avoid alcohol near bedtime (eg, late afternoon and evening)  ----Avoid smoking or other nicotine intake, particularly during the evening  ----Adjust the bedroom environment as needed to decrease stimuli (eg, reduce ambient light, turn off the television or radio) Avoid visible clocks and clock watching.  Keeping track of the time spent not sleeping can be stressful.  The light admitted from a clock and also disrupted.  ----Avoid prolonged use of light-emitting  screens (laptops, tablets, smartphones, ebooks) before bedtime Minimize lighting and use of electronics (TV, smart phone, computer) 60 minutes prior to sleep to induce relaxation.  Keep the lights dim before bedtime which will facilitate the release of melatonin from the pineal gland.  ----Resolve concerns or worries before bedtime eg  Write down a list of tomorrow's tasks to sleep worry free  ----Exercise regularly for at least 20 minutes, preferably more than four to five hours prior to bedtime .Getting regular exercise has been shown to improve sleep quality.  Moderate intensity exercise, preferably in the morning or early evening, is best.    ---Avoid daytime naps, especially if they are longer than 20 to 30 minutes or occur late in the day  --- Keep the Bedroom cool (65-67 Fahrenheit).  Keeping the face cool (and your hands and feet warm) can induce sleep and enhance melatonin production.  ---Invest in a quality pillow and mattress  ---Increase exposure to bright light or sunshine in the morning.        Sleep Herbs/ Supplements:  Sleep Induction:    Melatonin 0.3-5mg   IR vs ER  Sublingual  2 hours prior to target bedtime          ----Tryptophan>5HTP>Serotonin>Melatonin >>Binds NOEL    Valerian 400-900mg 2 hours prior to sleep Increase NOEL, used in benzo withdrawal    Passion Flower (with Combo Valerian/Hops/Lemon Balm)    Lemon Balm 160 mg BID +/-use in combo    Lavendar 80 mg or essential oil      Chamomile 200 mg BID     L Theanine 200mg up to Twice Daily  Anxiolytic not Sedation    Magnesium Glycinate /Chloride/ Citrate 500mg Daily can cause loose stools  Ashwagandha 500mg  BID Daily  Try AM first as can cause insomnia    Phosphytidyl Serine  500 mg Daily    Supplements:  Melatonin - 1-5 mg to fall asleep. and/or 5-20 mg time released melatonin to stay asleep n 5-HTP - 100-200 mg 1 hour before bedtime   Taurine - 500-2000 mg 1 hour before bedtime   Magnesium glycinate - 200-400 mg is a typical dose.     To  decrease nighttime cortisol or stress consider using   Ashwagandha  Phosphorylated serine  lactium casein decapeptide  L-theanine   or other calming herbs above    Establish an evening herbal tea habit, such as lemon balm            Vitamins and nutrients to support your thyroid function are:    You may find these in numerous multivitamins:    Selenium 200 mcg daily  Zinc 30 mg Daily/ Copper 1 mg   Vitamin D3 / K2 2000- 5000 IU /  mcg Daily (Shoot for a level of 50-60 vitamin D--need to follow)  Iodine 150 mcg Daily  Vitamin A 5000 IU Daily  Iron 18 mg Daily      Consider  Nigella Sativa (Black Cumin) 1 gram Twice Daily      Nutrients to help T4àT3 Conversion    Iodine 300 -1000 mcg Daily  L-Tyrosine 300-1000 mg Daily  Selenium 100-600 mcg Daily  Vitamin A 0578-1384 IU Daily  Vitamin E 200-400 IU Daily  N Acetyl Cysteine  500-2000 mg Daily  Zinc 30 mg Daily  Copper 1 mg Daily  Ashwagandha 500 -1500 mg Daily  Guggulipid extract  Turmeric (Curcumin) 500-1000 mg Daily    Think Nutritional Depletion IF  High TSH and Normal TPO (Thyroid Peroxidase Antibodies)  Think Autoimmune suppression IF High TSH and High TPO  Think HPT axis suppression with T4 normal and T3 Low normal and Elevataed Reverse T3     Examples of Thyroid Support supplement, there are many brands:    At [employer]    Thyroid Complete Supplement  13 $ month  B12  Iodine  Magnesium  Zinc  Copper  Manganese  Molybdenum  L tyrosine  Thyroid Gland Complex    Other   Black Cumin Seed Oil

## 2021-06-18 NOTE — PROGRESS NOTES
"Mental Health Visit Note    6/4/2018    Start time: 9:05 AM    Stop Time: 10:01 AM   Session # 2    Jennyfer Terrazas is a 43 y.o. female is being seen today for    Chief Complaint   Patient presents with     Follow-up     Depression     Anxiety   .     New symptoms or complaints: Patient reported improved mood since last session, and noted that her sleep has improved.  She found the Sleep Protocal techniques helpful (including eliminating screen time, relaxation breathing, thought stopping).     Functional Impairment:   Personal: 2  Family: 2  Work: 2  Social:2    Clinical assessment of mental status: Jennyfer presented on time for her appointment. She was oriented x3, open and cooperative, and dressed appropriately for this session and weather. Her memory was Normal cognitive functioning . Her speech was within normal. Language was appropriate to discussion. Concentration and focus is within normal. Psychosis is not noted nor reported. Her mood is euthymic, affect is congruent with mood. Fund of knowledge is adequate. Insight is adequate for therapy.     Suicidal/Homicidal Ideation present: None Reported This Session    Patient's impression of their current status: Patient stated, \"I feel so much better - it has helped that I'm sleeping better,\" as she elaborated on some of the sleep hygiene she has practiced to good effect.  She noticed thoughts that preceded panic feelings she had noticed where occasionally present when she was doing laundry - something that helped her gain presence and perspective, leading to some relief.  She identified thoughts of \"I'll never keep up\", as well as, \"I'm not good enough.\"  She considered these thoughts' origination in early childhood correlated with a traumatic experience starting  where the teacher showed insensitivity to her feelings. She reflected on these thoughts and compassion for her younger self.  She is authoring the values that are important to her (personal " "growth, family, parenting, joselyn, health) and the behaviors that serve them.    Therapist impression of patients current state: Patient is engaging in the therapeutic process. Her thoughts, feelings, and beliefs were processed. She is willing to explore strategies for improved symptom management using CBT and mindfulness methods.    Type of psychotherapeutic technique provided: Insight oriented, Client centered, Solution-focused and CBT    Progress toward short term goals:Progress as expected, therapeutic relationship building in progress.  Patient presents receptivity to techniques for symptom management.    Review of long term goals: Completed Treatment Plan.    Diagnosis:   1. Major depressive disorder, recurrent, moderate    2. Generalized anxiety disorder        Plan and Follow up: Patient plans to continue to nurture healthy routines of daily physical activity (including daily walks), healthy meals, and sleep hygiene.  Patient also plans to continue to practice a CBT thought exercise, The 3 C's (catch it, check it, change it) to practice noticing the relationship between her thoughts, feelings and behaviors.  Patient plans on watching ACT metaphors, \"Passengers on the Bus\" and \"Unwelcome Party Guest\" online.  Patient plans to return for follow up in 2-3 weeks.      Discharge Criteria/Planning: Patient will continue with follow-up until therapy can be discontinued without return of signs and symptoms.    Reyna Sloan 6/4/2018  "

## 2021-06-18 NOTE — PROGRESS NOTES
Outpatient Mental Health Treatment Plan    Name:  Jennyfer Terrazas  :  1975  MRN:  [id number]    Treatment Plan:  Initial Treatment Plan  Intake/initial treatment plan date:  18  Benefit and risks and alternatives have been discussed: Yes  Is this treatment appropriate with minimal intrusion/restrictions: Yes  Estimated duration of treatment:  Approximately 6-8 sessions.  Anticipated frequency of services:  Every 2 weeks  Necessity for frequency: This frequency is needed to establish therapeutic goals and for continuity of care in order to monitor progress.  Necessity for treatment: To address cognitive, behavioral, and/or emotional barriers in order to work toward goals and to improve quality of life.    Plan:      ? Depression    Goal:  Decrease average depression level from 2/3 to 1/4.   Strategies:    ?[x] Decrease social isolation     [x] Increase involvement in meaningful activities     ?[x] Discuss sleep hygiene     ?[x] Explore thoughts and expectations about self and others     ?[x] Process grief (loss of significant person, independence, role, etc.)     ?[x] Assess for suicide risk     ?[x] Implement physical activity routine (with physician approval)     [x] Consider introduction of bibliotherapy and/or videos     [x] Continue compliance with medical treatment plan (or explore barriers)       ?  ?Degree to which this is a problem: 3  Degree to which goal is met: goal established and in progress.  Date of Review: in 3 months.          ?   ? Anxiety    Goal:  Decrease average anxiety level from 2/4 to 1/4.   Strategies: ? [x]Learn and practice relaxation techniques and other coping strategies (e.g., thought stopping, reframing, meditation)     ? [x] Increase involvement in meaningful activities     ? [x] Discuss sleep hygiene     ? [x] Explore thoughts and expectations about self and others     ? [x] Identify and monitor triggers for panic/anxiety symptoms     ? [x] Implement physical activity  "routine (with physician approval)     ? [x] Consider introduction of bibliotherapy and/or videos     ? [x] Continue compliance with medical treatment plan (or explore barriers)                                       []     Degree to which this is a problem: 3  Degree to which goal is met: goal established and in progress  Date of Review: in 3 months       Functional Impairment:  1=Not at all/Rarely  2=Some days  3=Most Days  4=Every Day    Personal : 2  Family : 2  Social : 2   Work/school : 3    Diagnosis:  Major depressive disorder, recurrent, moderate; Generalized Anxiety disorder    WHODAS 2.0 12-item version 14      Scores presented in qualifiers to represent level of disability.  MODERATE Problem (medium, fair, ...) 25-49%    H1= 0  H2= 0  H3= 0    Clinical assessments and measures completed:. NIKITA-7, PHQ-9, CAGE-AID and PANSI     Strengths:  Jennyfer identifies strengths in artistic crafts, cooking, and enjoys reading.   Limitations: Jennyfer considers that shyness is a limitation for her. She acknowledges a tendency to be distrustful of others.   Cultural Considerations: Jennyfer is of [place] and [place] heritage.  She identified a tendency in her father to de-emphasize his [place] heritage. She also identified the influence of her father's alcoholism on her family system. She identified a value transmuted from her father of \"family is everything.\"    Persons responsible for this plan: Patient            Psychotherapist Signature           Patient Signature:              Guardian Signature             Provider: Performed and documented by ROSEANNE Dove   Date:  6/4/2018      "

## 2021-06-18 NOTE — PATIENT INSTRUCTIONS - HE
Patient Instructions by Jared Rainey MD at 2/14/2020  8:40 AM     Author: Jared Rainey MD Service: -- Author Type: Physician    Filed: 2/14/2020  9:18 AM Encounter Date: 2/14/2020 Status: Addendum    : Jared Rainey MD (Physician)    Related Notes: Original Note by Jared Rainey MD (Physician) filed at 2/14/2020  9:05 AM         MINIMIZE OR AVOID STIMULANTS   Avoid alcohol (wine, beer, and hard liquor) within 3 hours of bedtime.   Avoid caffeine-containing beverages or foods after 2 pm; if sensitive to caffeine, avoid it after 12 noon. (These items include Pepsi, Coke, Mountain Dew; tea, coffee, lattes, and chocolate; coffee- or espressocontaining ice creams or desserts). Read the labels of everything you eat and drink!   Avoid Sudafed or other decongestant cold medicines at night.   Some medications may have stimulating effects. Consult your pharmacist and doctor to determine whether any of them might be contributing to sleep problems. Do not discontinue them without permission from your doctor.   Complete any aerobic exercise before 6 pm (or at least 3 hours before bedtime).   2016     [facility] [facility] NIGHTTIME TENSION AND ANXIETY SLEEP PLANNING AND PREPARATION     Avoid anxiety-provoking activities close to bedtime.   Avoid watching the news before going to bed.   Avoid reading stimulating, exciting materials in bed.   Avoid paying bills before bed.   Avoid checking your financial reports or the stock market before bedtime.   Avoid arguments before bedtime.   Try to achieve some action plan or resolution of a discussion or argument before trying to go to sleep.   Avoid repeated negative judgments about the fact that you are unable to sleep.     Use positive self-talk phrases regarding your ability to relax and fall asleep: I can fall asleep. I can relax.     Try writing in your journal any disturbing thoughts that are running through your mind. n Schedule a time  within the next few days to deal with whatever is troubling you. If you are having trouble managing your concerns for more than a few weeks, consult your healthcare provider for treatment suggestions or a counseling/therapy referral.     There are many relaxing yoga or stress reducing mindful breathing CDs or DVDs available to help you find a relaxing bedtime ritual that works for you.     Plan your sleep by putting it into your schedule; plan for 8  to 9 hours in bed. n As much as possible, go to sleep and wake up at the same time each day. This will help train your biological clock.     Begin prepping for bedtime 30 minutes before getting in bed. n Avoid getting in bed after 11 pm as late-hour sleep is not as helpful as earlier sleep.     Avoid late afternoon or evening naps.     Avoid naps longer than 45 minutes unless you are sick or quite sleep deprived. n Avoid large meals or spicy foods before bed.     Finish all eating 3 hours prior to going to sleep.     Avoid drinking more than 4-8 ounces of fluid before going to bed.     Take a hot salt/soda aromatherapy bath--raising your body temperature before sleep helps induce sleep. A hot bath also relaxes muscles and reduces tension. Add 1-2 cups Epson salts (magnesium sulfate absorbed through the skin is very relaxing),   to 1 cup baking soda (sodium bicarbonate which is alkalizing to a stressed out acidic body) and 10 drops lavender oil (helps lower cortisol levels).     Version 4 Achieving better sleep can lead to many health improvements. This list of suggestions for better sleep is not meant to be implemented in its entirety. Instead, pick three to four changes to implement to improve sleep quality.   2016 [facility]    STRATEGIES TO USE WITH TROUBLE FALLING ASLEEP OR STAYING ASLEEP LIGHT, NOISE, TEMPERATURE, AND ENVIRONMENTAL ISSUES BEDDING AND PILLOWS SUPPLEMENTS AND LIGHT THERAPY   Dont stay in bed more than 20-30 minutes trying  to fall asleep. Leave your bedroom and go to a relaxing room other than the bedroom and read or do a relaxation technique (e.g., meditation).    Consider reading a good neutral book under low light to help with falling asleep.      If using a tablet or phone for reading, make sure they are in the nighttime setting and brightness is as low as possible.     If using a light, dont use a table lamp. Instead use a HUD light or other small light that only illuminates the reading material.     If you awaken early because of light, put a dark covering over your eyes.     If you awaken early because of recurrent thoughts, try writing them in a journal. If this does not help, consider counseling. Depression might be a factor.     Turn down the light in the bathroom and in rooms you are in 15 minutes before going to bed. n Decrease the light in your bedroom by using a dimmer or a reading light with a dimmer. n     Consider using lucille glasses for at least 30 minutes before bedtime to reduce light exposure. n Use dark window shades or consider a set of eye shades or a black covering for your eyes when trying to sleep or if you awaken too early because of light.     Decrease irritating noises in your space by closing windows, using ear plugs, or using a white noise generator or a HEPA air filter.   Turn off or remove any appliances or clocks that make noise.     Make sure your sleeping area is the correct temperature range (not too hot or too cold).     Avoid sleeping near electromagnetic fields. Try to have your head at least 8 feet away from electromagnetic fields, if possible. Possible sources of electromagnetic fields include: electrical outlets, clock radios, stereos, cell phones, computers and monitors. Consider moving these devices or moving your bed or your position in the bed. Consider using a Tri Field or other meter to test for these fields.     Avoid sleeping with an electric blanket on. Instead, turn on blanket when  prepping for bedtime then turn it off when getting into bed.     Consider replacing your pillows with hypoallergenic pillows. Use ultrafine allergy pillow and mattress covers.     Consider using a side sleeper pillow for under your neck when sleeping on your side n     Consider using a body pillow to hug and put between your knees to align your back and shoulders at night.     Roll backwards at a slight angle onto a body pillow if you have hip bursitis or shoulder pain. n Sleep on the highest quality bed linens you can afford. n Consider taking supplements to aid your sleep.     Sleep Hygiene Rules  Sleep hygiene refers to actions that tend to improve and maintain good sleep     ----Sleep as long as necessary to feel rested (usually seven to eight hours for adults) and then get out of bed  -----Maintain a regular sleep schedule, particularly a regular wake-up time in the morning  -----Try not to force sleep  ----Avoid caffeinated beverages after lunch  Avoid caffeine (after 3 PM) alcohol (after dinner).  Intake of either, particularly later in the day can significantly prescribe sleep cycle.  ----Avoid alcohol near bedtime (eg, late afternoon and evening)  ----Avoid smoking or other nicotine intake, particularly during the evening  ----Adjust the bedroom environment as needed to decrease stimuli (eg, reduce ambient light, turn off the television or radio) Avoid visible clocks and clock watching.  Keeping track of the time spent not sleeping can be stressful.  The light admitted from a clock and also disrupted.  ----Avoid prolonged use of light-emitting screens (laptops, tablets, smartphones, Proximusooks) before bedtime Minimize lighting and use of electronics (TV, smart phone, computer) 60 minutes prior to sleep to induce relaxation.  Keep the lights dim before bedtime which will facilitate the release of melatonin from the pineal gland.  ----Resolve concerns or worries before bedtime eg  Write down a list of tomorrow's  tasks to sleep worry free  ----Exercise regularly for at least 20 minutes, preferably more than four to five hours prior to bedtime .Getting regular exercise has been shown to improve sleep quality.  Moderate intensity exercise, preferably in the morning or early evening, is best.    ---Avoid daytime naps, especially if they are longer than 20 to 30 minutes or occur late in the day  --- Keep the Bedroom cool (65-67 Fahrenheit).  Keeping the face cool (and your hands and feet warm) can induce sleep and enhance melatonin production.  ---Invest in a quality pillow and mattress  ---Increase exposure to bright light or sunshine in the morning.        Sleep Herbs/ Supplements:  Sleep Induction:    Melatonin 0.3-5mg   IR vs ER  Sublingual  2 hours prior to target bedtime          ----Tryptophan>5HTP>Serotonin>Melatonin >>Binds NOEL    Valerian 400-900mg 2 hours prior to sleep Increase NOEL, used in benzo withdrawal    Passion Flower (with Combo Valerian/Hops/Lemon Balm)    Lemon Balm 160 mg BID +/-use in combo    Lavendar 80 mg or essential oil      Chamomile 200 mg BID     L Theanine 200mg up to Twice Daily  Green tea   Anxiolytic not Sedation    Magnesium Glycinate /Chloride/ Citrate 500mg Daily can cause loose stools  Ashwagandha 500mg  BID Daily  Try AM first as can cause insomnia    Phosphytidyl Serine  500 mg Daily    Supplements:  Melatonin - 1-5 mg to fall asleep. and/or 5-20 mg time released melatonin to stay asleep   Glycine ?  Magnesium glycinate - 200-400 mg is a typical dose.     To decrease nighttime cortisol or stress consider using   Phosphorylated serine  500 mg     L-theanine green tea   or other calming herbs above        Keep working on sleep    For a read regarding nutrition and food and mood  Read Julia Alfred and her work interactions with food and mental health        Adult Physical AVS      Thank you for scheduling and completing a Physical Check up!      There has been suggestions that this is an  Unnecessary part of the current care for patients by some. I do not agree!    It is a reminder to take stock in an overall health plan.     It also hopefully will engage dialogue about wellness and focusing on measure to stay well and fit, hopefully avoiding extra trips to see us!      -----------Wellness Pillars-----------    1. Nutrient Dense Nutrition-- we are or will become and are activated by what we eat! It is paramount that we all focus on eating well. This means trying to eat ?hole Foods, single ingredient foods that nourish and activate our bodies. Food that are high in nutrients help run our bodies in a way that whaley and can transform our health and help us to heal and repair. Major groups include. Fats----preferably plant based. These help ours brains and nervous system. Proteins---from plants and animals. Nuts, Beans and Animal proteins. These help our musculoskeletal system. Complex Carbohydrates---fresh fruits vegetables, and whole grains. In order to maintain balance, we must give our bodies balanced nutrition. There are things we should avoid. Most processed foods and all added sugar should be avoided. If you do not prepare your own food, order simple foods a la carte. Order a protein and pair it with a side of vegetables. Vegetable should occupy more than half of your plate. Try to avoid simple carbohydrates like chips or fries.   2. Restorative Sleep----we all need sleep to allow our bodies to heal and repair. Sleep is indispensable and necessary. Good quality sleep is different from the simple quantitative amount of sleep. It is possible to sleep without getting any rest or restorative sleep. This is why we ask about refreshing sleep, because it is possible to sleep and not have restorative sleep. If your sleep is not refreshing, you may requires a visit with a Sleep Specialist to help sort this out. For quantity, aim for 8-10 hours daily.   3. Movement---exercise has numerous health benefits.  Bottom line, in order to do the things we do in life, it is necessary to condition, nurture and repair our machine. Food provides the tools and the basic repair structure and vital nutrients. Rest allows time and focus for repair and restoration. Exercise conditions and activated reparative mechanisms. I would suggest thinking about one's high school weight as a rule and aim for a weight plus 15 pounds for men and 10 pounds for women as a goal. Also we should strive to engage in intentional activity 3 to 4 days of cardio fitness 30-60 minutes and 1-3 days of strength training. We want to be fit as we age and have stamina to do activities of daily living and beyond. Walk, bike, swim, run, box, dance, and so on, find your thing! The benefits are incredible! Exercise lowers blood pressure, helps treat and prevent diabetes and helps us live longer and in a way that is more satisfying. As Chely says, Just Do It!  4. Mindfulness----get in touch with your mind body connection. Take time daily to reflect on and be cognizant of how you are doing----eating, working, parenting, interacting with loved ones, friends and others. Be intentional and present in relating to things in which you are engaged.     Preventative Care    Colon Cancer screening. It may not be glamorous, but it saves lives and may save yours. There is colonoscopy and immuno-chemical testing. Pick your mode , but get it done. If there is a first degree relative that has had Colon Cancer, we may recommend screening 10 years before the age of that index case.     Breast Cancer Screening. This is mostly for women. Get to know the architecture of your breasts. If it makes you feel more comfortable, engage your partner as well. If something strikes you as ?ot right, get it checked out. Mammogram breast cancer screening does detect cancers. Self breast exams can detect cancers. Guidelines vary but in general start screen in adulthood for self exams and mammography in  your 40s. If there is a first degree relative or many with cancers, this may be earlier or involve genetic screening.     Pap Smears Cervical Cancer Screening---again women. Start screening after sexual activity or intercourse begins. Cervical cancer really is tied to exposure to HPV virus and this is related to sexual intercourse. Cervical cancer is treatable and preventable, as Pap smears should detect changes BEFORE cancerous transformation.   All women that have a cervix should be screen between 21-65 as a rule. Intervals vary based on age and medical history.     Vaccines. Yes there is controversy. But I still think vaccines save lives and reduce disease burden in our human populations. Please consider getting vaccinated as you are due for Vaccines.     About Vitamins and Supplements     I do recommend that adult and kids consider a multivitamin as way to enrich our nutrition.     A solid multivitamin. Ask one of us for recommendations. Our pharmacy carry some high quality lower cost vitamins that we have recommends.     Solsberry 3/6 Oils Fish Oils, Flax Oils, Krill Oils, Algea Oils, and Cumin Seed and Borage Oils are among the Omega 3s and 6s. Good oils nourish our nervous system and engage our immune system in positive ways.     Vitamin D. If you are in [place]. You probably need some. We shoot for a level of 50-60. So sometime this takes staring lower and titrating up a supplement. Start at 9333-9096 IU wit a fatty meal and check levels.             Certain supplements may help with our gut's immune system or Microbiome.   Start with plants, many and of diverse colors.   Consider cultured foods with live active bacteria. Examples are Yogurt, Kefir and Kombucha.     Eat Prebiotic foods from the Chicori family, asparagus, bananas, inulin fibers and more.     Other supplements that may help are Zinc Carnosine, D-limonene, Vitamin D and Colostrum.     It may prudent to try the Elimination Diet and eliminate  certain foods such as Wheat products, Dairy Products and Especially Refined Sugars.       We are screening multiple issues:     High Blood Pressure.   We ideally have readings less than 130 on the top number and less than 80 on the bottom.     Diabetes.   Diabetes is the body not processing sugars in an efficient way. When sugars are not dealt with in an efficient manner, every part of the body can be effected, the heart such as a heart attack or failure, the brain such as a stroke and really any part of the body. Insulin levels being overworked really drives diabetes. Lowering intake of simple sugar and exercising are two major ways to treat and stave off Diabetes.     Heart Disease:  Heart issues usually arise out of a combination of genetic issues and life stressors and choices.   Focusing on the Pillars of Wellness are ways to help prevent heart disease. Avoiding tobacco, limiting alcohol intake to moderate intake and addressing out  of balance cholesterol, presence of diabetes and persistently elevated blood pressure may require medications in addition to life style changes.     Skin cancer is typically due to cumulative Sun Damage. There are other genetic factor as well. If skin looks irritated or a mole changes color, shape, size or has irregular borders, get it checked out. Skin exams can also save lives.     If you are Diabetic or Have Known Heart Disease. We have goals for your best Care     A1C. For Diabetics     This is a measure of how well controlled your sugars are over the last 3 months. In general, we would like the percent number less than 7% for most diabetics. In the morning and before all meals the sugar number should be less than 150. If you are diabetic and this is the case, you are managing well. The Pillars of Wellness are still a guide to keeping your body in balance.     Blood Pressure for Diabetics and Heart Disease     Top < 130 Bottom < 80  This goal of blood pressure control reduces  Diabetes complications, such as stroke or heart attack. Life style first and add medication if consistently high.     No Tobacco. For Anyone!    Smoking tobacco or chewing produces by products that are particularly harmful to Diabetics, but really all of us. I implore you to not use tobacco products.     Aspirin for Heart Disease Patient     If you have high blood pressure, stroke or heart disease risk, you probably would benefit from a baby aspirin. There reasons to avoid aspirin such as allergies, risks for bleeding, etc. have the discussion of aspirin should be part of your therapy.     Statin Medications for Heart Disease, Vascular Disease or High Risk Patients    These medicine have statistically been shown to benefit Diabetic patients, especially those with disordered cholesterol profiles. I like to do an NMR panel that shows Atherogenic Risk (Stroke/ Heart Attack) and Insulin Resistance. Likely this may be recommended as part of you treatment plan.

## 2021-06-18 NOTE — PROGRESS NOTES
"Mental Health Visit Note    5/21/2018    Start time: 9:07 AM    Stop Time: 10:02 AM   Session # 1    Jennyfer Terrazas is a 42 y.o. female is being seen today for    Chief Complaint   Patient presents with     Follow-up     Depression     Anxiety   .     New symptoms or complaints: Patient reported that her anxiety and depression are slightly better.  She experienced panic symptoms last weekend and could not connect it to a particular stressor.      Functional Impairment:   Personal: 3  Family: 2  Work: 2  Social:3    Clinical assessment of mental status: Jennyfer presented on time for her appointment. She was oriented x3, open and cooperative, and dressed appropriately for this session and weather. Her memory was Normal cognitive functioning . Her speech was within normal. Language was appropriate to discussion. Concentration and focus is within normal. Psychosis is not noted nor reported. Her mood is dysphoric, affect is congruent with mood. Fund of knowledge is adequate. Insight is adequate for therapy.     Suicidal/Homicidal Ideation present: None Reported This Session    Patient's impression of their current status: Patient stated, \"I'm OK, but not great.\"  She considered that the season change and warmer weather have helped contribute to having a slightly better mood.  She started walking for part of her commute home.  She discussed a recent experience of panic symptoms while loading laundry into the machine.  She recalls glancing over at another pile and feeling overwhelmed.  She struggles to find an accompanying thought and considered that \"there is no reason for it.\"  She shared that she has not nurtured important routines of healthy, regular meals, daily physical activity, and sleep hygiene.  She is beginning to author the values that are important to her (family, parenting, work, joselyn, health) and the behaviors that serve them.    Therapist impression of patients current state: Patient is engaging in the " "therapeutic process. Her thoughts, feelings, and beliefs were processed. She is willing to explore strategies for improved symptom management using CBT and mindfulness methods.    Type of psychotherapeutic technique provided: Insight oriented, Client centered, Solution-focused and CBT    Progress toward short term goals:Progress as expected, therapeutic relationship building in progress.  Patient presents receptivity to techniques for symptom management.    Review of long term goals: will complete Treatment Plan at next follow up visit.  Ran out of time in today's session.    Diagnosis:   1. Major depressive disorder, recurrent, moderate    2. Generalized anxiety disorder        Plan and Follow up: Patient plans to nurture healthy routines of daily physical activity (has started to walk more), healthy meals, and sleep hygiene (provided a \"Sleep Protocol\" handout on how to nurture healthy sleep routine).  Patient also plans to do a CBT thought exercise, The 3 C's (catch it, check it, change it) to practice noticing the relationship between her thoughts, feelings and behaviors.  Patient plans to refer to a list of \"[facility] words\" to help her identify unhelpful thoughts, as she acknowledged she has struggled this.  Patient plans to return for follow up on 6/4/18.      Discharge Criteria/Planning: Patient will continue with follow-up until therapy can be discontinued without return of signs and symptoms.    Reyna Sloan 5/21/2018  "

## 2021-06-19 NOTE — PROGRESS NOTES
"Mental Health Visit Note    8/1/2018    Start time: 4:03 PM    Stop Time: 5:02 PM   Session # 3    Jennyfer Terrazas is a 43 y.o. female is being seen today for    Chief Complaint   Patient presents with     Follow-up     Depression     Anxiety   .     New symptoms or complaints: Patient reported experiencing 3 panic episodes in the last week, usually lasting 1 minute, however, one of them was almost 10 minutes long.  She described symptoms of \"the room closing in\", feeling frozen, increased HR, \"mind kind of goes blank\" and sometimes trembling.  She indicated she has had mild panic episodes over the last 2-3 years.  She reported that her anixiety has increased.      Functional Impairment:   Personal: 4  Family: 3  Work: 3  Social:2    Clinical assessment of mental status: Unchanged since 6/4/18: Jennyfer presented on time for her appointment. She was oriented x3, open and cooperative, and dressed appropriately for this session and weather. Her memory was Normal cognitive functioning . Her speech was within normal. Language was appropriate to discussion. Concentration and focus is within normal. Psychosis is not noted nor reported. Her mood is euthymic, affect is congruent with mood. Fund of knowledge is adequate. Insight is adequate for therapy.     Suicidal/Homicidal Ideation present: None Reported This Session    Patient's impression of their current status: Patient stated, \"I'm getting anxious more often - maybe it's cause my daughter has ADHD and autism,\" as she elaborated on some of the challenges of parenting her 9-year old.  She recognized that her daughter has anxiety and expresses a fear of school shootings due to school shootings in the news and lock downs that have occurred at her school.  She stated, \"My daughter keeps telling me she's afraid she is going to be shot,\" as she considered that this is impacting her own anxiety.  She discussed recent panic episodes that she cannot connect to any particular thought " "or stress.  She acknowledged that listening to the news can be especially stressful.  She also recognized increased stress at work where two staff recently quit and she and other staff are trying to cover other shifts. She is authoring the values that are important to her (personal growth, family, parenting, joselyn, health) and the behaviors that serve them.    Therapist impression of patients current state: Patient is engaging in the therapeutic process. Her thoughts, feelings, and beliefs were processed. She appeared more open in session today about her experience of panic episodes.  She is willing to explore strategies for improved symptom management using CBT and mindfulness methods.  She is applying methods discussed in session to reported benefit.      Type of psychotherapeutic technique provided: Insight oriented, Client centered, Solution-focused and CBT    Progress toward short term goals:Progress as expected, Patient is receptive to techniques for symptom management.    Review of long term goals: Patient is making progress on her long-term goals.     Diagnosis:   1. Major depressive disorder, recurrent, moderate (H)    2. Generalized anxiety disorder        Plan and Follow up: Patient plans to continue to nurture healthy routines of daily physical activity (including daily walks), healthy meals, and sleep hygiene. Patient plans on applying 3-2-1 Grounding Technique in the presence of panic symptoms to find relief.  She also plans on nurturing diaphragmatic/relaxation breathing.  She plans on applying ACT metaphor \"Passengers on the Bus\" to unhelpful/hard on self thoughts that she identifies.  Patient plans to continue to notice the relationship between her thoughts, feelings and behaviors.  Patient plans to return for follow up in 2 weeks.      Discharge Criteria/Planning: Patient will continue with follow-up until therapy can be discontinued without return of signs and symptoms.    eRyna Sloan" 8/1/2018

## 2021-06-20 NOTE — LETTER
Letter by Jared Rainey MD at      Author: Jared Rainey MD Service: -- Author Type: --    Filed:  Encounter Date: 2/14/2020 Status: (Other)                    My Depression Action Plan  Name: Jennyfer Terrazas   Date of Birth 1975  Date: 2/14/2020    My Doctor: Jared Rainey MD   My Clinic: [facility]  [address] [place] [address] [address]  [phone]          GREEN    ZONE   Good Control    What it looks like:     Things are going generally well. You have normal ups and downs. You may even feel depressed from time to time, but bad moods usually last less than a day.   What you need to do:  1. Continue to care for yourself (see self care plan)  2. Check your depression survival kit and update it as needed  3. Follow your physicians recommendations including any medication.  4. Do not stop taking medication unless you consult with your physician first.           YELLOW         ZONE Getting Worse    What it looks like:     Depression is starting to interfere with your life.     It may be hard to get out of bed; you may be starting to isolate yourself from others.    Symptoms of depression are starting to last most all day and this has happened for several days.     You may have suicidal thoughts but they are not constant.   What you need to do:     1. Call your care team. Your response to treatment will improve if you keep your care team informed of your progress. Yellow periods are signs an adjustment may need to be made.     2. Continue your self-care.  Just get dressed and ready for the day.  Don't give yourself time to talk yourself out of it.    3. Talk to someone in your support network.    4. Open up your depression Depression Self-Care Plan / Wellness kit.           RED    ZONE Medical Alert - Get Help    What it looks like:     Depression is seriously interfering with your life.     You may experience these or other symptoms: You cant get out of bed most days, cant  work or engage in other necessary activities, you have trouble taking care of basic hygiene, or basic responsibilities, thoughts of suicide or death that will not go away, self-injurious behavior.     What you need to do:  1. Call your care team and request a same-day appointment. If they are not available (weekends or after hours) call your local crisis line, emergency room or 911.            Self-Care Plan / Wellness Kit    Self-Care for Depression  Heres the deal. Your body and mind are really not as separate as most people think.  What you do and think affects how you feel and how you feel influences what you do and think. This means if you do things that people who feel good do, it will help you feel better.  Sometimes this is all it takes.  There is also a place for medication and therapy depending on how severe your depression is, so be sure to consult with your medical provider and/ or Behavioral Health Consultant if your symptoms are worsening or not improving.     In order to better manage my stress, I will:    Exercise  Get some form of exercise, every day. This will help reduce pain and release endorphins, the feel good chemicals in your brain. This is almost as good as taking antidepressants!  This is not the same as joining a gym and then never going! (they count on that by the way?) It can be as simple as just going for a walk or doing some gardening, anything that will get you moving.      Hygiene   Maintain good hygiene (get out of bed in the morning, make your bed, brush your teeth, take a shower, and get dressed like you were going to work, even if you are unemployed).  If your clothes don't fit try to get ones that do.    Diet  Strive to eat foods that are good for me, drink plenty of water, and avoid excessive sugar, caffeine, alcohol, and other mood-altering substances.  Some foods that are helpful in depression are: complex carbohydrates, B vitamins, flaxseed, fish or fish oil, fresh fruits  and vegetables.    Psychotherapy  Agree to participate in Individual Therapy (if recommended).    Medication  If prescribed medications, I agree to take them.  Missing doses can result in serious side effects.  I understand that drinking alcohol, or other illicit drug use, may cause potential side effects.  I will not stop my medication abruptly without first discussing it with my provider.    Staying Connected With Others  Stay in touch with my friends, family members, and my primary care provider/team.    Use your imagination  Be creative.  We all have a creative side; it doesnt matter if its oil painting, sand castles, or mud pies! This will also kick up the endorphins.    Witness Beauty  (AKA stop and smell the roses) Take a look outside, even in mid-winter. Notice colors, textures. Watch the squirrels and birds.     Service to others  Be of service to others.  There is always someone else in need.  By helping others we can get out of ourselves and remember the really important things.  This also provides opportunities for practicing all the other parts of the program.    Humor  Laugh and be silly!  Adjust your TV habits for less news and crime-drama and more comedy.    Control your stress  Try breathing deep, massage therapy, biofeedback, and meditation. Find time to relax each day.     Crisis Text Line  http://www.crisistextline.org    The Crisis Text Line serves anyone, in any type of crisis, providing access to free, 24/7 support and information via the medium people already use and trust:    Here's how it works:  1.  Text 538-981 from anywhere in the [place], anytime, about any type of crisis.  2.  A live, trained Crisis Counselor receives the text and responds quickly.  3.  The volunteer Crisis Counselor will help you move from a 'hot moment to a cool moment'.  My support system    Clinic Contact:  Phone number:    Contact 1:  Phone number:    Contact 2:  Phone number:    Yarsani/:  Phone  number:    Therapist:  Phone number:    [facility] crisis center:    Phone number:    Other community support:  Phone number:

## 2021-06-21 NOTE — LETTER
Letter by Placido Herbert DO at      Author: Placido Herbert DO Service: -- Author Type: --    Filed:  Encounter Date: 5/17/2021 Status: (Other)         Jennyfer Terrazas  [address] [place] [address]             May 17, 2021         Dear Ms. Terrazas,    Below are the results from your recent visit:    Resulted Orders   CK Total   Result Value Ref Range    CK, Total 43 30 - 190 U/L   Comprehensive metabolic panel   Result Value Ref Range    Sodium 139 136 - 145 mmol/L    Potassium 4.7 3.5 - 5.0 mmol/L    Chloride 108 (H) 98 - 107 mmol/L    CO2 23 22 - 31 mmol/L    Anion Gap, Calculation 8 5 - 18 mmol/L    Glucose 79 70 - 125 mg/dL    BUN 17 8 - 22 mg/dL    Creatinine 0.86 0.60 - 1.10 mg/dL    GFR MDRD Af Amer >60 >60 mL/min/1.73m2    GFR MDRD Non Af Amer >60 >60 mL/min/1.73m2    Bilirubin, Total 0.5 0.0 - 1.0 mg/dL    Calcium 8.9 8.5 - 10.5 mg/dL    Protein, Total 7.9 6.0 - 8.0 g/dL    Albumin 3.9 3.5 - 5.0 g/dL    Alkaline Phosphatase 114 45 - 120 U/L    AST 15 0 - 40 U/L    ALT 15 0 - 45 U/L    Narrative    Fasting Glucose reference range is 70-99 mg/dL per  American Diabetes Association (ADA) guidelines.   GGT (Gamma GT)   Result Value Ref Range    GGT (Gamma GT) 38 0 - 50 U/L   HM1 (CBC with Diff)   Result Value Ref Range    WBC 5.7 4.0 - 11.0 thou/uL    RBC 4.37 3.80 - 5.40 mill/uL    Hemoglobin 12.6 12.0 - 16.0 g/dL    Hematocrit 38.6 35.0 - 47.0 %    MCV 88 80 - 100 fL    MCH 28.8 27.0 - 34.0 pg    MCHC 32.6 32.0 - 36.0 g/dL    RDW 14.6 (H) 11.0 - 14.5 %    Platelets 195 140 - 440 thou/uL    MPV 10.4 (H) 7.0 - 10.0 fL    Neutrophils % 69 50 - 70 %    Lymphocytes % 20 20 - 40 %    Monocytes % 9 2 - 10 %    Eosinophils % 1 0 - 6 %    Basophils % 0 0 - 2 %    Immature Granulocyte % 0 <=0 %    Neutrophils Absolute 4.0 2.0 - 7.7 thou/uL    Lymphocytes Absolute 1.1 0.8 - 4.4 thou/uL    Monocytes Absolute 0.5 0.0 - 0.9 thou/uL    Eosinophils Absolute 0.1 0.0 - 0.4 thou/uL     Basophils Absolute 0.0 0.0 - 0.2 thou/uL    Immature Granulocyte Absolute 0.0 <=0.0 thou/uL     Sorry for the delay in commenting on your test results, as I was away over parts of March and April, 2021. Results reviewed by team RN, during my absence.  The following are comments associated with your lab results.     Normal CK muscle enzyme.     Normal GGT liver enzyme.     Stable comprehensive metabolic panel, with some improvement noted compared to 2 weeks prior.     Stable CBC/cell count.     Sincerely,        Electronically signed by Placido Herbert DO

## 2021-06-23 ENCOUNTER — AMBULATORY - HEALTHEAST (OUTPATIENT)
Dept: LAB | Facility: CLINIC | Age: 46
End: 2021-06-23

## 2021-06-23 DIAGNOSIS — N25.89 TYPE II RTA: ICD-10-CM

## 2021-06-23 DIAGNOSIS — R76.0 ANTICARDIOLIPIN ANTIBODY POSITIVE: ICD-10-CM

## 2021-06-23 DIAGNOSIS — M19.90 INFLAMMATORY ARTHRITIS: ICD-10-CM

## 2021-06-23 LAB
ALBUMIN SERPL-MCNC: 3.7 G/DL (ref 3.5–5)
ALT SERPL W P-5'-P-CCNC: 18 U/L (ref 0–45)
AST SERPL W P-5'-P-CCNC: 18 U/L (ref 0–40)
BASOPHILS # BLD AUTO: 0 THOU/UL (ref 0–0.2)
BASOPHILS NFR BLD AUTO: 0 % (ref 0–2)
CREAT SERPL-MCNC: 0.84 MG/DL (ref 0.6–1.1)
EOSINOPHIL # BLD AUTO: 0.1 THOU/UL (ref 0–0.4)
EOSINOPHIL NFR BLD AUTO: 1 % (ref 0–6)
ERYTHROCYTE [DISTWIDTH] IN BLOOD BY AUTOMATED COUNT: 13.9 % (ref 11–14.5)
GFR SERPL CREATININE-BSD FRML MDRD: >60 ML/MIN/1.73M2
HCT VFR BLD AUTO: 37.8 % (ref 35–47)
HGB BLD-MCNC: 12.4 G/DL (ref 12–16)
IMM GRANULOCYTES # BLD: 0 THOU/UL
IMM GRANULOCYTES NFR BLD: 0 %
LYMPHOCYTES # BLD AUTO: 1 THOU/UL (ref 0.8–4.4)
LYMPHOCYTES NFR BLD AUTO: 18 % (ref 20–40)
MCH RBC QN AUTO: 28.4 PG (ref 27–34)
MCHC RBC AUTO-ENTMCNC: 32.8 G/DL (ref 32–36)
MCV RBC AUTO: 87 FL (ref 80–100)
MONOCYTES # BLD AUTO: 0.5 THOU/UL (ref 0–0.9)
MONOCYTES NFR BLD AUTO: 9 % (ref 2–10)
NEUTROPHILS # BLD AUTO: 3.9 THOU/UL (ref 2–7.7)
NEUTROPHILS NFR BLD AUTO: 71 % (ref 50–70)
PLATELET # BLD AUTO: 193 THOU/UL (ref 140–440)
PMV BLD AUTO: 10.8 FL (ref 7–10)
RBC # BLD AUTO: 4.36 MILL/UL (ref 3.8–5.4)
WBC: 5.4 THOU/UL (ref 4–11)

## 2021-06-24 LAB
B2 GLYCOPROT1 IGG SERPL IA-ACNC: 1.6 U/ML
B2 GLYCOPROT1 IGM SERPL IA-ACNC: 11 U/ML
CARDIOLIPIN IGA SER IA-ACNC: 16.3 APL U/ML (ref 0–19.9)
CARDIOLIPIN IGG SER IA-ACNC: 2.6 GPL-U/ML (ref 0–19.9)
CARDIOLIPIN IGM SER IA-ACNC: 11.3 MPL-U/ML (ref 0–19.9)

## 2021-06-24 RX ORDER — HYDROXYCHLOROQUINE SULFATE 200 MG/1
200 TABLET, FILM COATED ORAL 2 TIMES DAILY
Qty: 60 TABLET | Refills: 1 | Status: SHIPPED | OUTPATIENT
Start: 2021-06-24 | End: 2021-10-17

## 2021-06-25 NOTE — TELEPHONE ENCOUNTER
Please call to schedule lab and f/u appt with Dr Herbert    Refilled per Rheum RN refill protocol  Needs appts.

## 2021-06-27 ENCOUNTER — HEALTH MAINTENANCE LETTER (OUTPATIENT)
Age: 46
End: 2021-06-27

## 2021-06-27 NOTE — PROGRESS NOTES
Progress Notes by Jared Rainey MD at 2/19/2019 11:00 AM     Author: Jared Rainey MD Service: -- Author Type: Physician    Filed: 2/24/2019  6:00 PM Encounter Date: 2/19/2019 Status: Signed    : Jared Rainey MD (Physician)       ASSESSMENT & PLAN    No problem-specific Assessment & Plan notes found for this encounter.      Jennyfer was seen today for depression and nevus.    Diagnoses and all orders for this visit:    Visit for screening mammogram  -     Mammo Screening Bilateral; Future    Major depressive disorder, recurrent episode (H)  -     citalopram (CELEXA) 20 MG tablet; Take 1.5 tablets (30 mg total) by mouth daily.  -     Vitamin B12  -     Thyroid Cascade  -     Thyroid Peroxidase Antibody  -     Vitamin D, Total (25-Hydroxy)  -     T3, Total  -     T4, Free    Generalized anxiety disorder  -     citalopram (CELEXA) 20 MG tablet; Take 1.5 tablets (30 mg total) by mouth daily.    Vitamin B12 deficiency (non anemic)  -     Vitamin B12    Vitamin D deficiency  -     Vitamin D, Total (25-Hydroxy)    Anti-TPO antibodies present  -     Thyroid Cascade  -     Thyroid Peroxidase Antibody  -     T3, Total  -     T4, Free        Patient Instructions   Follow-through with Breann Luther        Also Consider CBD oil  Plus CBD is one Online Brand  Yellow Label 15 mg Daily  Other Choices listed below     Sleep  Could try:  At [facility]    Trial  These Natural Meds  Source naturals Night Rest  Ashwaganda 500 mg Twice Daily  Phosphatidyl Serine 500 mg Daily  Magnesium Glycinate or Citrate 500 mg Daily              MINIMIZE OR AVOID STIMULANTS   Avoid alcohol (wine, beer, and hard liquor) within 3 hours of bedtime.   Avoid caffeine-containing beverages or foods after 2 pm; if sensitive to caffeine, avoid it after 12 noon. (These items include Pepsi, Coke, Mountain Dew; tea, coffee, lattes, and chocolate; coffee- or espressocontaining ice creams or desserts). Read the labels of everything you eat and  drink!   Avoid Sudafed or other decongestant cold medicines at night.   Some medications may have stimulating effects. Consult your pharmacist and doctor to determine whether any of them might be contributing to sleep problems. Do not discontinue them without permission from your doctor.   Complete any aerobic exercise before 6 pm (or at least 3 hours before bedtime).   2016     [facility] NIGHTTIME TENSION AND ANXIETY SLEEP PLANNING AND PREPARATION     Avoid anxiety-provoking activities close to bedtime.   Avoid watching the news before going to bed.   Avoid reading stimulating, exciting materials in bed.   Avoid paying bills before bed.   Avoid checking your financial reports or the stock market before bedtime.   Avoid arguments before bedtime.   Try to achieve some action plan or resolution of a discussion or argument before trying to go to sleep.   Avoid repeated negative judgments about the fact that you are unable to sleep.     Use positive self-talk phrases regarding your ability to relax and fall asleep: I can fall asleep. I can relax.     Try writing in your journal any disturbing thoughts that are running through your mind. n Schedule a time within the next few days to deal with whatever is troubling you. If you are having trouble managing your concerns for more than a few weeks, consult your healthcare provider for treatment suggestions or a counseling/therapy referral.     There are many relaxing yoga or stress reducing mindful breathing CDs or DVDs available to help you find a relaxing bedtime ritual that works for you.     Plan your sleep by putting it into your schedule; plan for 8  to 9 hours in bed. n As much as possible, go to sleep and wake up at the same time each day. This will help train your biological clock.     Begin prepping for bedtime 30 minutes before getting in bed. n Avoid getting in bed after 11 pm as late-hour sleep is not as helpful as earlier sleep.      Avoid late afternoon or evening naps.     Avoid naps longer than 45 minutes unless you are sick or quite sleep deprived. n Avoid large meals or spicy foods before bed.     Finish all eating 3 hours prior to going to sleep.     Avoid drinking more than 4-8 ounces of fluid before going to bed.     Take a hot salt/soda aromatherapy bath--raising your body temperature before sleep helps induce sleep. A hot bath also relaxes muscles and reduces tension. Add 1-2 cups Epson salts (magnesium sulfate absorbed through the skin is very relaxing),   to 1 cup baking soda (sodium bicarbonate which is alkalizing to a stressed out acidic body) and 10 drops lavender oil (helps lower cortisol levels).     Version 4 Achieving better sleep can lead to many health improvements. This list of suggestions for better sleep is not meant to be implemented in its entirety. Instead, pick three to four changes to implement to improve sleep quality.   2016 [facility]    STRATEGIES TO USE WITH TROUBLE FALLING ASLEEP OR STAYING ASLEEP LIGHT, NOISE, TEMPERATURE, AND ENVIRONMENTAL ISSUES BEDDING AND PILLOWS SUPPLEMENTS AND LIGHT THERAPY   Dont stay in bed more than 20-30 minutes trying to fall asleep. Leave your bedroom and go to a relaxing room other than the bedroom and read or do a relaxation technique (e.g., meditation).    Consider reading a good neutral book under low light to help with falling asleep.      If using a tablet or phone for reading, make sure they are in the nighttime setting and brightness is as low as possible.     If using a light, dont use a table lamp. Instead use a HUD light or other small light that only illuminates the reading material.     If you awaken early because of light, put a dark covering over your eyes.     If you awaken early because of recurrent thoughts, try writing them in a journal. If this does not help, consider counseling. Depression might be a factor.     Turn down the  light in the bathroom and in rooms you are in 15 minutes before going to bed. n Decrease the light in your bedroom by using a dimmer or a reading light with a dimmer. n     Consider using lucille glasses for at least 30 minutes before bedtime to reduce light exposure. n Use dark window shades or consider a set of eye shades or a black covering for your eyes when trying to sleep or if you awaken too early because of light.     Decrease irritating noises in your space by closing windows, using ear plugs, or using a white noise generator or a HEPA air filter.   Turn off or remove any appliances or clocks that make noise.     Make sure your sleeping area is the correct temperature range (not too hot or too cold).     Avoid sleeping near electromagnetic fields. Try to have your head at least 8 feet away from electromagnetic fields, if possible. Possible sources of electromagnetic fields include: electrical outlets, clock radios, stereos, cell phones, computers and monitors. Consider moving these devices or moving your bed or your position in the bed. Consider using a Tri Field or other meter to test for these fields.     Avoid sleeping with an electric blanket on. Instead, turn on blanket when prepping for bedtime then turn it off when getting into bed.     Consider replacing your pillows with hypoallergenic pillows. Use ultrafine allergy pillow and mattress covers.     Consider using a side sleeper pillow for under your neck when sleeping on your side n     Consider using a body pillow to hug and put between your knees to align your back and shoulders at night.     Roll backwards at a slight angle onto a body pillow if you have hip bursitis or shoulder pain. n Sleep on the highest quality bed linens you can afford. n Consider taking supplements to aid your sleep.     Sleep Hygiene Rules  Sleep hygiene refers to actions that tend to improve and maintain good sleep     ----Sleep as long as necessary to feel rested (usually  seven to eight hours for adults) and then get out of bed  -----Maintain a regular sleep schedule, particularly a regular wake-up time in the morning  -----Try not to force sleep  ----Avoid caffeinated beverages after lunch  Avoid caffeine (after 3 PM) alcohol (after dinner).  Intake of either, particularly later in the day can significantly prescribe sleep cycle.  ----Avoid alcohol near bedtime (eg, late afternoon and evening)  ----Avoid smoking or other nicotine intake, particularly during the evening  ----Adjust the bedroom environment as needed to decrease stimuli (eg, reduce ambient light, turn off the television or radio) Avoid visible clocks and clock watching.  Keeping track of the time spent not sleeping can be stressful.  The light admitted from a clock and also disrupted.  ----Avoid prolonged use of light-emitting screens (laptops, tablets, smartphones, M87ooks) before bedtime Minimize lighting and use of electronics (TV, smart phone, computer) 60 minutes prior to sleep to induce relaxation.  Keep the lights dim before bedtime which will facilitate the release of melatonin from the pineal gland.  ----Resolve concerns or worries before bedtime eg  Write down a list of tomorrow's tasks to sleep worry free  ----Exercise regularly for at least 20 minutes, preferably more than four to five hours prior to bedtime .Getting regular exercise has been shown to improve sleep quality.  Moderate intensity exercise, preferably in the morning or early evening, is best.    ---Avoid daytime naps, especially if they are longer than 20 to 30 minutes or occur late in the day  --- Keep the Bedroom cool (65-67 Fahrenheit).  Keeping the face cool (and your hands and feet warm) can induce sleep and enhance melatonin production.  ---Invest in a quality pillow and mattress  ---Increase exposure to bright light or sunshine in the morning.        Sleep Herbs/ Supplements:  Sleep Induction:    Melatonin 0.3-5mg   IR vs ER  Sublingual   2 hours prior to target bedtime          ----Tryptophan>5HTP>Serotonin>Melatonin >>Binds NOEL    Valerian 400-900mg 2 hours prior to sleep Increase NOEL, used in benzo withdrawal    Passion Flower (with Combo Valerian/Hops/Lemon Balm)    Lemon Balm 160 mg BID +/-use in combo    Lavendar 80 mg or essential oil      Chamomile 200 mg BID     L Theanine 200mg up to Twice Daily  Anxiolytic not Sedation    Magnesium Glycinate /Chloride/ Citrate 500mg Daily can cause loose stools  Ashwagandha 500mg  BID Daily  Try AM first as can cause insomnia    Phosphytidyl Serine  500 mg Daily    Supplements:  Melatonin - 1-5 mg to fall asleep. and/or 5-20 mg time released melatonin to stay asleep n 5-HTP - 100-200 mg 1 hour before bedtime   Taurine - 500-2000 mg 1 hour before bedtime   Magnesium glycinate - 200-400 mg is a typical dose.     To decrease nighttime cortisol or stress consider using   Ashwagandha  Phosphorylated serine  lactium casein decapeptide  L-theanine   or other calming herbs above    Establish an evening herbal tea habit, such as lemon balm            Vitamins and nutrients to support your thyroid function are:    You may find these in numerous multivitamins:    Selenium 200 mcg daily  Zinc 30 mg Daily/ Copper 1 mg   Vitamin D3 / K2 2000- 5000 IU /  mcg Daily (Shoot for a level of 50-60 vitamin D--need to follow)  Iodine 150 mcg Daily  Vitamin A 5000 IU Daily  Iron 18 mg Daily      Consider  Nigella Sativa (Black Cumin) 1 gram Twice Daily      Nutrients to help T4àT3 Conversion    Iodine 300 -1000 mcg Daily  L-Tyrosine 300-1000 mg Daily  Selenium 100-600 mcg Daily  Vitamin A 4420-4523 IU Daily  Vitamin E 200-400 IU Daily  N Acetyl Cysteine  500-2000 mg Daily  Zinc 30 mg Daily  Copper 1 mg Daily  Ashwagandha 500 -1500 mg Daily  Guggulipid extract  Turmeric (Curcumin) 500-1000 mg Daily    Think Nutritional Depletion IF  High TSH and Normal TPO (Thyroid Peroxidase Antibodies)  Think Autoimmune suppression IF  High TSH and High TPO  Think HPT axis suppression with T4 normal and T3 Low normal and Elevataed Reverse T3     Examples of Thyroid Support supplement, there are many brands:    At [employer]    Thyroid Complete Supplement  13 $ month  B12  Iodine  Magnesium  Zinc  Copper  Manganese  Molybdenum  L tyrosine  Thyroid Gland Complex    Other   Black Cumin Seed Oil               Return in about 6 months (around 8/19/2019).       Little interest or pleasure in doing things: Several days  Feeling down, depressed, or hopeless: Several days  Trouble falling or staying asleep, or sleeping too much: Several days  Feeling tired or having little energy: Several days  Poor appetite or overeating: Not at all  Feeling bad about yourself - or that you are a failure or have let yourself or your family down: Not at all  Trouble concentrating on things, such as reading the newspaper or watching television: Several days  Moving or speaking so slowly that other people could have noticed. Or the opposite - being so fidgety or restless that you have been moving around a lot more than usual: Not at all  Thoughts that you would be better off dead, or of hurting yourself in some way: Not at all  PHQ-9 Total Score: 5  If you checked off any problems, how difficult have these problems made it for you to do your work, take care of things at home, or get along with other people?: Not difficult at all    CHIEF COMPLAINT: Jennyfer Terrazas had concerns including Depression (Medication follow up) and Nevus (right forearm getting bigger and changing colors).    [place]: 1.............. had concerns including Depression (Medication follow up) and Nevus (right forearm getting bigger and changing colors).    1. Visit for screening mammogram    2. Major depressive disorder, recurrent episode (H)    3. Generalized anxiety disorder    4. Vitamin B12 deficiency (non anemic)    5. Vitamin D deficiency    6. Anti-TPO antibodies present          CC:             Why  are you here today?                              Depression medication check    Comes in today follow-up on depression PHQ 9 today is 5 doing better  She currently takes Celexa/citalopram 30 mg daily no side effects she wants to continue the medication    She seen Breann Sloan in the past is been over 6 months has a mixture of anxiety depression but feels like she is doing better    Does have some trouble with sleep we talked about sleep hygiene today as well as possible sleep aids    Right arm she has had a mole is started to scab over she is not sure if she had an injury but she just wants to have it looked at today she has had no personal family history of melanoma        Past abnormal labs thyroid peroxidase antibodies, vitamin D, B12        Any other Problems in order of Priority:        SUBJECTIVE:  Jennyfer Terrazas is a 43 y.o. female    Past Medical History:   Diagnosis Date   ? Anxiety    ? Depression      Past Surgical History:   Procedure Laterality Date   ? ND MYOMECTOMY 1-4,W/TOT 250GMS/<,ABD APPRCH      Description: Uterine Myomectomy;  Recorded: 05/05/2014;   ? UTERINE FIBROID SURGERY       Acetaminophen-codeine and Grass  Current Outpatient Medications   Medication Sig Dispense Refill   ? citalopram (CELEXA) 20 MG tablet Take 1.5 tablets (30 mg total) by mouth daily. 135 tablet 3   ? ibuprofen (ADVIL,MOTRIN) 200 MG tablet Take 200 mg by mouth as needed for pain.       No current facility-administered medications for this visit.      Family History   Problem Relation Age of Onset   ? Arthritis Mother    ? Cancer Mother    ? Depression Mother    ? Hearing loss Mother    ? Vision loss Mother    ? Cancer Father         leukemia    ? Hearing loss Father    ? Asthma Paternal Aunt    ? Diabetes Maternal Grandfather    ? Hypertension Maternal Grandfather    ? Arthritis Paternal Grandmother    ? Heart disease Paternal Grandmother    ? Hypertension Paternal Grandmother    ? Kidney disease Paternal Grandmother    ?  Mental illness Paternal Grandmother    ? Alcohol abuse Paternal Grandfather    ? Arthritis Paternal Grandfather    ? Asthma Paternal Grandfather    ? Clotting disorder Paternal Grandfather    ? Dementia Paternal Grandfather    ? Depression Paternal Grandfather      Social History     Socioeconomic History   ? Marital status:      Spouse name: None   ? Number of children: None   ? Years of education: None   ? Highest education level: None   Occupational History   ? None   Social Needs   ? Financial resource strain: None   ? Food insecurity:     Worry: None     Inability: None   ? Transportation needs:     Medical: None     Non-medical: None   Tobacco Use   ? Smoking status: Never Smoker   ? Smokeless tobacco: Never Used   Substance and Sexual Activity   ? Alcohol use: No   ? Drug use: No   ? Sexual activity: Not Currently     Partners: Male     Birth control/protection: None   Lifestyle   ? Physical activity:     Days per week: None     Minutes per session: None   ? Stress: None   Relationships   ? Social connections:     Talks on phone: None     Gets together: None     Attends [employer] service: None     Active member of club or organization: None     Attends meetings of clubs or organizations: None     Relationship status: None   ? Intimate partner violence:     Fear of current or ex partner: None     Emotionally abused: None     Physically abused: None     Forced sexual activity: None   Other Topics Concern   ? None   Social History Narrative   ? None     Patient Active Problem List   Diagnosis   ? Major Depression, Recurrent   ? Anemia   ? Generalized Anxiety Disorder   ? Lower resp. tract infection   ? Vitamin B12 deficiency (non anemic)   ? Vitamin D deficiency   ? Anti-TPO antibodies present                                              SOCIAL: She  reports that  has never smoked. she has never used smokeless tobacco. She reports that she does not drink alcohol or use drugs.    REVIEW OF SYSTEMS:    Family history not pertinent to chief complaint or presenting problem    Review of Systems:      Nervous System:  No headache, paresthesia or dizziness or fainting                                  Ears: No hearing loss or ringing in the ears    Eyes: No blurring of vision, Double Vision            No redness, itching or dryness.    Nose: No nosebleed or loss of smell    Mouth: No mouth sores or  coated tongue    Throat: No hoarseness or difficulty swallowing    Neck: No enlarged thyroid or lymph nodes.    Heart: No chest pain, palpitation or irregular heartbeat.                  Lungs: No shortness of breath, wheezing or hemoptysis.    Gastrointestinal: No nausea or vomiting, melena or blood in stools.    Kidney/Bladdr: No polyuria, polydipsia, or hematuria.                             Genital/Sexual: No Sex function Changes                                Skin: No rash    Muscles/Joints/Bones: No Muscle morning stiffness, No Effusion of a Joint     Review of systems otherwise negative as requested from patient, except   Those positive ROS outlined and discussed in [place].    OBJECTIVE:  /84 (Patient Site: Right Arm, Patient Position: Sitting, Cuff Size: Adult Regular)   Pulse 74   Wt 171 lb (77.6 kg)   LMP 01/08/2019   SpO2 100%   Breastfeeding? No   BMI 32.20 kg/m      GENERAL:     No acute distress.   Alert and oriented X 3         Physical:    Dark circles under eyes  Nasal mucosa some mild bleeding in the right nasal canal otherwise clear  Oropharynx several dental amalgams  Thyroid pump nontender without nodules  Carotid pulses full without bruits  Lungs are clear  Cardiac S1-S2 regular sinus no appreciable murmur gallop  Abdomen truncal obesity  No tremor  No lower extremity edema        ASSESSMENT & PLAN      Jennyfer was seen today for depression and nevus.    Diagnoses and all orders for this visit:    Visit for screening mammogram  -     Mammo Screening Bilateral; Future    Major depressive  disorder, recurrent episode (H)  -     citalopram (CELEXA) 20 MG tablet; Take 1.5 tablets (30 mg total) by mouth daily.  -     Vitamin B12  -     Thyroid Cascade  -     Thyroid Peroxidase Antibody  -     Vitamin D, Total (25-Hydroxy)  -     T3, Total  -     T4, Free    Generalized anxiety disorder  -     citalopram (CELEXA) 20 MG tablet; Take 1.5 tablets (30 mg total) by mouth daily.    Vitamin B12 deficiency (non anemic)  -     Vitamin B12    Vitamin D deficiency  -     Vitamin D, Total (25-Hydroxy)    Anti-TPO antibodies present  -     Thyroid Cascade  -     Thyroid Peroxidase Antibody  -     T3, Total  -     T4, Free        Return in about 6 months (around 8/19/2019).       Anticipatory Guidance and Symptomatic Cares Discussed   Advised to call back directly if there are further questions, or if these symptoms fail to improve as anticipated or worsen.  Return to clinic if patient has a clinical concern that warrants an exam.         I spent 30 minutes with this patient face to face, of which 50% or greater was spent in counseling and coordination of care with regards to Jennyfer was seen today for depression and nevus.    Diagnoses and all orders for this visit:    Visit for screening mammogram  -     Mammo Screening Bilateral; Future    Major depressive disorder, recurrent episode (H)  -     citalopram (CELEXA) 20 MG tablet; Take 1.5 tablets (30 mg total) by mouth daily.  -     Vitamin B12  -     Thyroid Cascade  -     Thyroid Peroxidase Antibody  -     Vitamin D, Total (25-Hydroxy)  -     T3, Total  -     T4, Free    Generalized anxiety disorder  -     citalopram (CELEXA) 20 MG tablet; Take 1.5 tablets (30 mg total) by mouth daily.    Vitamin B12 deficiency (non anemic)  -     Vitamin B12    Vitamin D deficiency  -     Vitamin D, Total (25-Hydroxy)    Anti-TPO antibodies present  -     Thyroid Cascade  -     Thyroid Peroxidase Antibody  -     T3, Total  -     T4, Free        Jared Rainey MD  Family Medicine    [facility] [id number]  [phone]

## 2021-06-28 NOTE — PROGRESS NOTES
Progress Notes by Jared Rainey MD at 2/14/2020  8:40 AM     Author: Jared Rainey MD Service: -- Author Type: Physician    Filed: 2/15/2020  2:50 PM Encounter Date: 2/14/2020 Status: Signed    : Jared Rainey MD (Physician)       ASSESSMENT & PLAN    No problem-specific Assessment & Plan notes found for this encounter.      Jennyfer was seen today for medication check.    Diagnoses and all orders for this visit:    Major depressive disorder, recurrent episode (H)  -     citalopram (CELEXA) 20 MG tablet; Take 1 tablet (20 mg total) by mouth daily.    Generalized anxiety disorder  -     citalopram (CELEXA) 20 MG tablet; Take 1 tablet (20 mg total) by mouth daily.    Other orders  -     Influenza, Seasonal Quad, PF =/> 6months  -     Tdap vaccine,  6yo or older,  IM        Patient Instructions     MINIMIZE OR AVOID STIMULANTS   Avoid alcohol (wine, beer, and hard liquor) within 3 hours of bedtime.   Avoid caffeine-containing beverages or foods after 2 pm; if sensitive to caffeine, avoid it after 12 noon. (These items include Pepsi, Coke, Mountain Dew; tea, coffee, lattes, and chocolate; coffee- or espressocontaining ice creams or desserts). Read the labels of everything you eat and drink!   Avoid Sudafed or other decongestant cold medicines at night.   Some medications may have stimulating effects. Consult your pharmacist and doctor to determine whether any of them might be contributing to sleep problems. Do not discontinue them without permission from your doctor.   Complete any aerobic exercise before 6 pm (or at least 3 hours before bedtime).   2016     [facility] [place] [facility] NIGHTTIME TENSION AND ANXIETY SLEEP PLANNING AND PREPARATION     Avoid anxiety-provoking activities close to bedtime.   Avoid watching the news before going to bed.   Avoid reading stimulating, exciting materials in bed.   Avoid paying bills before bed.   Avoid checking your financial reports or the stock market  before bedtime.   Avoid arguments before bedtime.   Try to achieve some action plan or resolution of a discussion or argument before trying to go to sleep.   Avoid repeated negative judgments about the fact that you are unable to sleep.     Use positive self-talk phrases regarding your ability to relax and fall asleep: I can fall asleep. I can relax.     Try writing in your journal any disturbing thoughts that are running through your mind. n Schedule a time within the next few days to deal with whatever is troubling you. If you are having trouble managing your concerns for more than a few weeks, consult your healthcare provider for treatment suggestions or a counseling/therapy referral.     There are many relaxing yoga or stress reducing mindful breathing CDs or DVDs available to help you find a relaxing bedtime ritual that works for you.     Plan your sleep by putting it into your schedule; plan for 8  to 9 hours in bed. n As much as possible, go to sleep and wake up at the same time each day. This will help train your biological clock.     Begin prepping for bedtime 30 minutes before getting in bed. n Avoid getting in bed after 11 pm as late-hour sleep is not as helpful as earlier sleep.     Avoid late afternoon or evening naps.     Avoid naps longer than 45 minutes unless you are sick or quite sleep deprived. n Avoid large meals or spicy foods before bed.     Finish all eating 3 hours prior to going to sleep.     Avoid drinking more than 4-8 ounces of fluid before going to bed.     Take a hot salt/soda aromatherapy bath--raising your body temperature before sleep helps induce sleep. A hot bath also relaxes muscles and reduces tension. Add 1-2 cups Epson salts (magnesium sulfate absorbed through the skin is very relaxing),   to 1 cup baking soda (sodium bicarbonate which is alkalizing to a stressed out acidic body) and 10 drops lavender oil (helps lower cortisol levels).     Version 4 Achieving better sleep can  lead to many health improvements. This list of suggestions for better sleep is not meant to be implemented in its entirety. Instead, pick three to four changes to implement to improve sleep quality.   2016 [facility] [place] [facility]    STRATEGIES TO USE WITH TROUBLE FALLING ASLEEP OR STAYING ASLEEP LIGHT, NOISE, TEMPERATURE, AND ENVIRONMENTAL ISSUES BEDDING AND PILLOWS SUPPLEMENTS AND LIGHT THERAPY   Dont stay in bed more than 20-30 minutes trying to fall asleep. Leave your bedroom and go to a relaxing room other than the bedroom and read or do a relaxation technique (e.g., meditation).    Consider reading a good neutral book under low light to help with falling asleep.      If using a tablet or phone for reading, make sure they are in the nighttime setting and brightness is as low as possible.     If using a light, dont use a table lamp. Instead use a HUD light or other small light that only illuminates the reading material.     If you awaken early because of light, put a dark covering over your eyes.     If you awaken early because of recurrent thoughts, try writing them in a journal. If this does not help, consider counseling. Depression might be a factor.     Turn down the light in the bathroom and in rooms you are in 15 minutes before going to bed. n Decrease the light in your bedroom by using a dimmer or a reading light with a dimmer. n     Consider using lucille glasses for at least 30 minutes before bedtime to reduce light exposure. n Use dark window shades or consider a set of eye shades or a black covering for your eyes when trying to sleep or if you awaken too early because of light.     Decrease irritating noises in your space by closing windows, using ear plugs, or using a white noise generator or a HEPA air filter.   Turn off or remove any appliances or clocks that make noise.     Make sure your sleeping area is the correct temperature range (not too hot or too cold).     Avoid sleeping near  electromagnetic fields. Try to have your head at least 8 feet away from electromagnetic fields, if possible. Possible sources of electromagnetic fields include: electrical outlets, clock radios, stereos, cell phones, computers and monitors. Consider moving these devices or moving your bed or your position in the bed. Consider using a Tri Field or other meter to test for these fields.     Avoid sleeping with an electric blanket on. Instead, turn on blanket when prepping for bedtime then turn it off when getting into bed.     Consider replacing your pillows with hypoallergenic pillows. Use ultrafine allergy pillow and mattress covers.     Consider using a side sleeper pillow for under your neck when sleeping on your side n     Consider using a body pillow to hug and put between your knees to align your back and shoulders at night.     Roll backwards at a slight angle onto a body pillow if you have hip bursitis or shoulder pain. n Sleep on the highest quality bed linens you can afford. n Consider taking supplements to aid your sleep.     Sleep Hygiene Rules  Sleep hygiene refers to actions that tend to improve and maintain good sleep     ----Sleep as long as necessary to feel rested (usually seven to eight hours for adults) and then get out of bed  -----Maintain a regular sleep schedule, particularly a regular wake-up time in the morning  -----Try not to force sleep  ----Avoid caffeinated beverages after lunch  Avoid caffeine (after 3 PM) alcohol (after dinner).  Intake of either, particularly later in the day can significantly prescribe sleep cycle.  ----Avoid alcohol near bedtime (eg, late afternoon and evening)  ----Avoid smoking or other nicotine intake, particularly during the evening  ----Adjust the bedroom environment as needed to decrease stimuli (eg, reduce ambient light, turn off the television or radio) Avoid visible clocks and clock watching.  Keeping track of the time spent not sleeping can be stressful.   The light admitted from a clock and also disrupted.  ----Avoid prolonged use of light-emitting screens (laptops, tablets, smartphones, ebooks) before bedtime Minimize lighting and use of electronics (TV, smart phone, computer) 60 minutes prior to sleep to induce relaxation.  Keep the lights dim before bedtime which will facilitate the release of melatonin from the pineal gland.  ----Resolve concerns or worries before bedtime eg  Write down a list of tomorrow's tasks to sleep worry free  ----Exercise regularly for at least 20 minutes, preferably more than four to five hours prior to bedtime .Getting regular exercise has been shown to improve sleep quality.  Moderate intensity exercise, preferably in the morning or early evening, is best.    ---Avoid daytime naps, especially if they are longer than 20 to 30 minutes or occur late in the day  --- Keep the Bedroom cool (65-67 Fahrenheit).  Keeping the face cool (and your hands and feet warm) can induce sleep and enhance melatonin production.  ---Invest in a quality pillow and mattress  ---Increase exposure to bright light or sunshine in the morning.        Sleep Herbs/ Supplements:  Sleep Induction:    Melatonin 0.3-5mg   IR vs ER  Sublingual  2 hours prior to target bedtime          ----Tryptophan>5HTP>Serotonin>Melatonin >>Binds NOEL    Valerian 400-900mg 2 hours prior to sleep Increase NOEL, used in benzo withdrawal    Passion Flower (with Combo Valerian/Hops/Lemon Balm)    Lemon Balm 160 mg BID +/-use in combo    Lavendar 80 mg or essential oil      Chamomile 200 mg BID     L Theanine 200mg up to Twice Daily  Green tea   Anxiolytic not Sedation    Magnesium Glycinate /Chloride/ Citrate 500mg Daily can cause loose stools  Ashwagandha 500mg  BID Daily  Try AM first as can cause insomnia    Phosphytidyl Serine  500 mg Daily    Supplements:  Melatonin - 1-5 mg to fall asleep. and/or 5-20 mg time released melatonin to stay asleep   Glycine ?  Magnesium glycinate - 200-400  mg is a typical dose.     To decrease nighttime cortisol or stress consider using   Phosphorylated serine  500 mg     L-theanine green tea   or other calming herbs above        Keep working on sleep    For a read regarding nutrition and food and mood  Read Julia Alfred and her work interactions with food and mental health        Adult Physical AVS      Thank you for scheduling and completing a Physical Check up!      There has been suggestions that this is an Unnecessary part of the current care for patients by some. I do not agree!    It is a reminder to take stock in an overall health plan.     It also hopefully will engage dialogue about wellness and focusing on measure to stay well and fit, hopefully avoiding extra trips to see us!      -----------Wellness Pillars-----------    1. Nutrient Dense Nutrition-- we are or will become and are activated by what we eat! It is paramount that we all focus on eating well. This means trying to eat ?hole Foods, single ingredient foods that nourish and activate our bodies. Food that are high in nutrients help run our bodies in a way that whaley and can transform our health and help us to heal and repair. Major groups include. Fats----preferably plant based. These help ours brains and nervous system. Proteins---from plants and animals. Nuts, Beans and Animal proteins. These help our musculoskeletal system. Complex Carbohydrates---fresh fruits vegetables, and whole grains. In order to maintain balance, we must give our bodies balanced nutrition. There are things we should avoid. Most processed foods and all added sugar should be avoided. If you do not prepare your own food, order simple foods a la carte. Order a protein and pair it with a side of vegetables. Vegetable should occupy more than half of your plate. Try to avoid simple carbohydrates like chips or fries.   2. Restorative Sleep----we all need sleep to allow our bodies to heal and repair. Sleep is indispensable and  necessary. Good quality sleep is different from the simple quantitative amount of sleep. It is possible to sleep without getting any rest or restorative sleep. This is why we ask about refreshing sleep, because it is possible to sleep and not have restorative sleep. If your sleep is not refreshing, you may requires a visit with a Sleep Specialist to help sort this out. For quantity, aim for 8-10 hours daily.   3. Movement---exercise has numerous health benefits. Bottom line, in order to do the things we do in life, it is necessary to condition, nurture and repair our machine. Food provides the tools and the basic repair structure and vital nutrients. Rest allows time and focus for repair and restoration. Exercise conditions and activated reparative mechanisms. I would suggest thinking about one's high school weight as a rule and aim for a weight plus 15 pounds for men and 10 pounds for women as a goal. Also we should strive to engage in intentional activity 3 to 4 days of cardio fitness 30-60 minutes and 1-3 days of strength training. We want to be fit as we age and have stamina to do activities of daily living and beyond. Walk, bike, swim, run, box, dance, and so on, find your thing! The benefits are incredible! Exercise lowers blood pressure, helps treat and prevent diabetes and helps us live longer and in a way that is more satisfying. As Chely says, Just Do It!  4. Mindfulness----get in touch with your mind body connection. Take time daily to reflect on and be cognizant of how you are doing----eating, working, parenting, interacting with loved ones, friends and others. Be intentional and present in relating to things in which you are engaged.     Preventative Care    Colon Cancer screening. It may not be glamorous, but it saves lives and may save yours. There is colonoscopy and immuno-chemical testing. Pick your mode , but get it done. If there is a first degree relative that has had Colon Cancer, we may  recommend screening 10 years before the age of that index case.     Breast Cancer Screening. This is mostly for women. Get to know the architecture of your breasts. If it makes you feel more comfortable, engage your partner as well. If something strikes you as ?ot right, get it checked out. Mammogram breast cancer screening does detect cancers. Self breast exams can detect cancers. Guidelines vary but in general start screen in adulthood for self exams and mammography in your 40s. If there is a first degree relative or many with cancers, this may be earlier or involve genetic screening.     Pap Smears Cervical Cancer Screening---again women. Start screening after sexual activity or intercourse begins. Cervical cancer really is tied to exposure to HPV virus and this is related to sexual intercourse. Cervical cancer is treatable and preventable, as Pap smears should detect changes BEFORE cancerous transformation.   All women that have a cervix should be screen between 21-65 as a rule. Intervals vary based on age and medical history.     Vaccines. Yes there is controversy. But I still think vaccines save lives and reduce disease burden in our human populations. Please consider getting vaccinated as you are due for Vaccines.     About Vitamins and Supplements     I do recommend that adult and kids consider a multivitamin as way to enrich our nutrition.     A solid multivitamin. Ask one of us for recommendations. Our pharmacy carry some high quality lower cost vitamins that we have recommends.     [place] 3/6 Oils Fish Oils, Flax Oils, Krill Oils, Algea Oils, and Cumin Seed and Borage Oils are among the Omega 3s and 6s. Good oils nourish our nervous system and engage our immune system in positive ways.     Vitamin D. If you are in [place]. You probably need some. We shoot for a level of 50-60. So sometime this takes staring lower and titrating up a supplement. Start at 2328-7916 IU wit a fatty meal and check levels.              Certain supplements may help with our gut's immune system or Microbiome.   Start with plants, many and of diverse colors.   Consider cultured foods with live active bacteria. Examples are Yogurt, Kefir and Kombucha.     Eat Prebiotic foods from the Chicori family, asparagus, bananas, inulin fibers and more.     Other supplements that may help are Zinc Carnosine, D-limonene, Vitamin D and Colostrum.     It may prudent to try the Elimination Diet and eliminate certain foods such as Wheat products, Dairy Products and Especially Refined Sugars.       We are screening multiple issues:     High Blood Pressure.   We ideally have readings less than 130 on the top number and less than 80 on the bottom.     Diabetes.   Diabetes is the body not processing sugars in an efficient way. When sugars are not dealt with in an efficient manner, every part of the body can be effected, the heart such as a heart attack or failure, the brain such as a stroke and really any part of the body. Insulin levels being overworked really drives diabetes. Lowering intake of simple sugar and exercising are two major ways to treat and stave off Diabetes.     Heart Disease:  Heart issues usually arise out of a combination of genetic issues and life stressors and choices.   Focusing on the Pillars of Wellness are ways to help prevent heart disease. Avoiding tobacco, limiting alcohol intake to moderate intake and addressing out  of balance cholesterol, presence of diabetes and persistently elevated blood pressure may require medications in addition to life style changes.     Skin cancer is typically due to cumulative Sun Damage. There are other genetic factor as well. If skin looks irritated or a mole changes color, shape, size or has irregular borders, get it checked out. Skin exams can also save lives.     If you are Diabetic or Have Known Heart Disease. We have goals for your best Care     A1C. For Diabetics     This is a measure of how  well controlled your sugars are over the last 3 months. In general, we would like the percent number less than 7% for most diabetics. In the morning and before all meals the sugar number should be less than 150. If you are diabetic and this is the case, you are managing well. The Pillars of Wellness are still a guide to keeping your body in balance.     Blood Pressure for Diabetics and Heart Disease     Top < 130 Bottom < 80  This goal of blood pressure control reduces Diabetes complications, such as stroke or heart attack. Life style first and add medication if consistently high.     No Tobacco. For Anyone!    Smoking tobacco or chewing produces by products that are particularly harmful to Diabetics, but really all of us. I implore you to not use tobacco products.     Aspirin for Heart Disease Patient     If you have high blood pressure, stroke or heart disease risk, you probably would benefit from a baby aspirin. There reasons to avoid aspirin such as allergies, risks for bleeding, etc. have the discussion of aspirin should be part of your therapy.     Statin Medications for Heart Disease, Vascular Disease or High Risk Patients    These medicine have statistically been shown to benefit Diabetic patients, especially those with disordered cholesterol profiles. I like to do an NMR panel that shows Atherogenic Risk (Stroke/ Heart Attack) and Insulin Resistance. Likely this may be recommended as part of you treatment plan.         Return in about 3 months (around 5/14/2020).       Little interest or pleasure in doing things: Not at all  Feeling down, depressed, or hopeless: Not at all  Trouble falling or staying asleep, or sleeping too much: Several days  Feeling tired or having little energy: Several days  Poor appetite or overeating: Not at all  Feeling bad about yourself - or that you are a failure or have let yourself or your family down: Not at all  Trouble concentrating on things, such as reading the newspaper or  "watching television: Several days  Moving or speaking so slowly that other people could have noticed. Or the opposite - being so fidgety or restless that you have been moving around a lot more than usual: Not at all  Thoughts that you would be better off dead, or of hurting yourself in some way: Not at all  PHQ-9 Total Score: 3  If you checked off any problems, how difficult have these problems made it for you to do your work, take care of things at home, or get along with other people?: Somewhat difficult    CHIEF COMPLAINT: Jennyfer Terrazas had concerns including Medication check (Citalopram).    [place]: 1.............. had concerns including Medication check (Citalopram).    1. Major depressive disorder, recurrent episode (H)    2. Generalized anxiety disorder          CC:             Why are you here today?                                   Medication check- citalopram      deprssion anxiety  Long time   Better!  Med helpful  \"definitey\"    At first headaches    No Nausea!  No Sex Dysfunction      Sleep working on         No other concerns    Wants a flu shot today                  SUBJECTIVE:  Jennyfer Terrazas is a 44 y.o. female                                SOCIAL: She  reports that she has never smoked. She has never used smokeless tobacco. She reports that she does not drink alcohol or use drugs.    REVIEW OF SYSTEMS:   Family history not pertinent to chief complaint or presenting problem    Review of Systems:      Nervous System:  No new or change in headache, paresthesia or tremor                                  Ears: No new hearing loss or ringing in the ears    Eyes: No new blurring of vision, Double Vision                Nose: No new nosebleed or loss of smell    Mouth: No new mouth sores or  coated tongue    Throat: No new hoarseness or difficulty swallowing    Neck: No new neck pain or mass    Heart: No new chest pain, palpitation or irregular heartbeat.                  Lungs: No new shortness of breath, " wheezing or hemoptysis.    Gastrointestinal: No new nausea or vomiting, melena or blood in stools.    Kidney/Bladder: No new polyuria, polydipsia, or hematuria.                             Genital/Sexual: No new Sex function Changes                                Skin: No new rash    Muscles/Joints/Bones: No changes in muscles / joint swelling     Review of systems otherwise negative as requested from patient, except   Those positive ROS outlined and discussed in [place].      VITALS:      Physical Exam:  Full range of affect  Oropharynx is clear  Nasal mucosa mildly congested right side  TMs are clear  No carotid bruits  Thyroid palpable no appreciable nodules  Lungs are clear  No lower extremity edema  No tremor  No appreciable dysplastic nevi she has scattered compound nevi on her body        Vitals:    02/14/20 0847   BP: 100/68   Patient Site: Right Arm   Patient Position: Sitting   Cuff Size: Adult Regular   Pulse: 60   Weight: 165 lb (74.8 kg)     Wt Readings from Last 3 Encounters:   02/14/20 165 lb (74.8 kg)   08/21/19 162 lb (73.5 kg)   02/19/19 171 lb (77.6 kg)     Body mass index is 31.07 kg/m .    PFSH:    Social History     Tobacco Use   Smoking Status Never Smoker   Smokeless Tobacco Never Used       Family History   Problem Relation Age of Onset   ? Arthritis Mother    ? Cancer Mother    ? Depression Mother    ? Hearing loss Mother    ? Vision loss Mother    ? Cancer Father         leukemia    ? Hearing loss Father    ? Asthma Paternal Aunt    ? Diabetes Maternal Grandfather    ? Hypertension Maternal Grandfather    ? Arthritis Paternal Grandmother    ? Heart disease Paternal Grandmother    ? Hypertension Paternal Grandmother    ? Kidney disease Paternal Grandmother    ? Mental illness Paternal Grandmother    ? Alcohol abuse Paternal Grandfather    ? Arthritis Paternal Grandfather    ? Asthma Paternal Grandfather    ? Clotting disorder Paternal Grandfather    ? Dementia Paternal Grandfather    ?  Depression Paternal Grandfather        Social History     Socioeconomic History   ? Marital status:      Spouse name: Not on file   ? Number of children: Not on file   ? Years of education: Not on file   ? Highest education level: Not on file   Occupational History   ? Not on file   Social Needs   ? Financial resource strain: Not on file   ? Food insecurity:     Worry: Not on file     Inability: Not on file   ? Transportation needs:     Medical: Not on file     Non-medical: Not on file   Tobacco Use   ? Smoking status: Never Smoker   ? Smokeless tobacco: Never Used   Substance and Sexual Activity   ? Alcohol use: No   ? Drug use: No   ? Sexual activity: Not Currently     Partners: Male     Birth control/protection: None   Lifestyle   ? Physical activity:     Days per week: Not on file     Minutes per session: Not on file   ? Stress: Not on file   Relationships   ? Social connections:     Talks on phone: Not on file     Gets together: Not on file     Attends Buddhism service: Not on file     Active member of club or organization: Not on file     Attends meetings of clubs or organizations: Not on file     Relationship status: Not on file   ? Intimate partner violence:     Fear of current or ex partner: Not on file     Emotionally abused: Not on file     Physically abused: Not on file     Forced sexual activity: Not on file   Other Topics Concern   ? Not on file   Social History Narrative   ? Not on file       Past Surgical History:   Procedure Laterality Date   ? [place] MYOMECTOMY 1-4,W/TOT 250GMS/<,ABD APPRCH      Description: Uterine Myomectomy;  Recorded: 05/05/2014;   ? UTERINE FIBROID SURGERY         Allergies   Allergen Reactions   ? Acetaminophen-Codeine    ? Grass        Active Ambulatory Problems     Diagnosis Date Noted   ? Major Depression, Recurrent    ? Anemia    ? Generalized Anxiety Disorder    ? Lower resp. tract infection 04/09/2015   ? Vitamin B12 deficiency (non anemic) 02/19/2019   ? Vitamin D  deficiency 02/19/2019   ? Anti-TPO antibodies present 02/19/2019     Resolved Ambulatory Problems     Diagnosis Date Noted   ? No Resolved Ambulatory Problems     Past Medical History:   Diagnosis Date   ? Anxiety    ? Depression          MEDICATIONS:  Current Outpatient Medications   Medication Sig Dispense Refill   ? citalopram (CELEXA) 20 MG tablet Take 1 tablet (20 mg total) by mouth daily. 90 tablet 3     No current facility-administered medications for this visit.               I spent 20  minutes with this patient face to face, of which 50% or greater was spent in counseling and coordination of care with regards to Jennyfer was seen today for medication check.    Diagnoses and all orders for this visit:    Major depressive disorder, recurrent episode (H)  -     citalopram (CELEXA) 20 MG tablet; Take 1 tablet (20 mg total) by mouth daily.    Generalized anxiety disorder  -     citalopram (CELEXA) 20 MG tablet; Take 1 tablet (20 mg total) by mouth daily.    Other orders  -     Influenza, Seasonal Quad, PF =/> 6months  -     Tdap vaccine,  8yo or older,  IM        Jared Rainey MD  Family Medicine   [facility] [address] [phone]

## 2021-07-07 ENCOUNTER — AMBULATORY - HEALTHEAST (OUTPATIENT)
Dept: RHEUMATOLOGY | Facility: CLINIC | Age: 46
End: 2021-07-07

## 2021-07-07 ENCOUNTER — COMMUNICATION - HEALTHEAST (OUTPATIENT)
Dept: RHEUMATOLOGY | Facility: CLINIC | Age: 46
End: 2021-07-07

## 2021-07-07 DIAGNOSIS — N25.89 TYPE II RTA: ICD-10-CM

## 2021-07-07 DIAGNOSIS — M19.90 INFLAMMATORY ARTHRITIS: ICD-10-CM

## 2021-07-12 ENCOUNTER — MYC REFILL (OUTPATIENT)
Dept: FAMILY MEDICINE | Facility: CLINIC | Age: 46
End: 2021-07-12

## 2021-07-12 DIAGNOSIS — F33.42 RECURRENT MAJOR DEPRESSIVE DISORDER, IN FULL REMISSION (H): ICD-10-CM

## 2021-07-13 RX ORDER — CITALOPRAM HYDROBROMIDE 20 MG/1
20 TABLET ORAL EVERY EVENING
Qty: 90 TABLET | Refills: 3 | Status: SHIPPED | OUTPATIENT
Start: 2021-07-13 | End: 2022-10-04

## 2021-08-03 ENCOUNTER — VIRTUAL VISIT (OUTPATIENT)
Dept: RHEUMATOLOGY | Facility: CLINIC | Age: 46
End: 2021-08-03
Payer: COMMERCIAL

## 2021-08-03 DIAGNOSIS — M35.04 SJOGREN'S SYNDROME WITH TUBULO-INTERSTITIAL NEPHROPATHY (H): Primary | ICD-10-CM

## 2021-08-03 DIAGNOSIS — R76.0 ANTICARDIOLIPIN ANTIBODY POSITIVE: ICD-10-CM

## 2021-08-03 DIAGNOSIS — R53.83 FATIGUE, UNSPECIFIED TYPE: ICD-10-CM

## 2021-08-03 DIAGNOSIS — N25.89 TYPE II RTA: ICD-10-CM

## 2021-08-03 PROCEDURE — 99215 OFFICE O/P EST HI 40 MIN: CPT | Mod: 95 | Performed by: INTERNAL MEDICINE

## 2021-08-03 NOTE — PROGRESS NOTES
Jennyfer Terrazas who presents today with a chief complaint of  No chief complaint on file.      Joint Pains: yes  Location: wrists and ankles  Onset: for awhile  Intensity:  2/10  AM Stiffness: Minutes  Alleviating/Aggravating Factors: weather change and using wrist too much increase pain.  Tolerating Meds: yes  Other:      ROS:  Patient denies having any chest pain, shortness of breath, cough, abdominal pain, nausea, vomiting, rashes, fevers, oral ulcers and recent infections.  Patient admits to having a good appetite      Problem List:  Patient Active Problem List   Diagnosis     Major Depression, Recurrent     Anemia     Generalized Anxiety Disorder     Lower resp. tract infection     Vitamin B12 deficiency (non anemic)     Vitamin D deficiency     Anti-TPO antibodies present     Hypokalemia     Non-traumatic rhabdomyolysis     Metabolic acidosis     Generalized muscle weakness        PMH:   Past Medical History:   Diagnosis Date     Anxiety      Depression        Surgical History:  Past Surgical History:   Procedure Laterality Date     C EXCIS UTERINE FIBROID,ABD APPRCH      Description: Uterine Myomectomy;  Recorded: 05/05/2014;     UTERINE FIBROID SURGERY         Family History:  Family History   Problem Relation Age of Onset     Arthritis Mother      Cancer Mother      Depression Mother      Hearing Loss Mother      Vision Loss Mother      Cancer Father         leukemia      Hearing Loss Father      Asthma Paternal Aunt      Diabetes Maternal Grandfather      Hypertension Maternal Grandfather      Arthritis Paternal Grandmother      Heart Disease Paternal Grandmother      Hypertension Paternal Grandmother      Kidney Disease Paternal Grandmother      Mental Illness Paternal Grandmother      Alcoholism Paternal Grandfather      Arthritis Paternal Grandfather      Asthma Paternal Grandfather      Clotting Disorder Paternal Grandfather      Dementia Paternal Grandfather      Depression Paternal Grandfather         Social History:   reports that she has never smoked. She has never used smokeless tobacco. She reports that she does not drink alcohol and does not use drugs.    Allergies:  Allergies   Allergen Reactions     Codeine Other (See Comments)     Tylenol 3, stomach pains that resulted in ER visit     Grass Unknown        Current Medications:  Current Outpatient Medications   Medication Sig Dispense Refill     caffeine (FOR VIVARIN) 200 mg Tab [CAFFEINE (FOR VIVARIN) 200 MG TAB] Take 200 mg by mouth.       citalopram (CELEXA) 20 MG tablet Take 1 tablet (20 mg) by mouth every evening 90 tablet 3     hydrOXYchloroQUINE (PLAQUENIL) 200 mg tablet [HYDROXYCHLOROQUINE (PLAQUENIL) 200 MG TABLET] Take 1 tablet (200 mg total) by mouth 2 (two) times a day. 60 tablet 1     hydrOXYchloroQUINE (PLAQUENIL) 200 mg tablet [HYDROXYCHLOROQUINE (PLAQUENIL) 200 MG TABLET] Take 1 tablet (200 mg total) by mouth 2 (two) times a day. 60 tablet 0     mv-mn/iron fum/FA/omega3,6,9#3 (WOMEN'S MULTI ORAL) [MV-MN/IRON FUM/FA/OMEGA3,6,9#3 (WOMEN'S MULTI ORAL)] Take 1 tablet by mouth daily.       potassium chloride (KLOR-CON) 20 mEq packet [POTASSIUM CHLORIDE (KLOR-CON) 20 MEQ PACKET] Take 20 mEq by mouth 2 (two) times a day. 180 packet 3     sodium bicarbonate 650 MG tablet [SODIUM BICARBONATE 650 MG TABLET] TAKE 2 TABLETS (1,300 MG) BY MOUTH 2 (TWO) TIMES A DAY. 120 tablet 0           Physical Exam:  Following up today via video visit, per Covid-19 pandemic requirements.    Verbal consent has been obtained for this service by care team member.    Video call start time: 2:11 PM, failed mychart despite troubleshooting for about 20 minutes.    Doximity start: 2:32 PM on and off until 2:45 PM.  Tried calling patient, unable to connect.  Left message to team will try connecting again with her towards the end of the day at about 5:30 PM.    Video Start: 5:37 PM    Video call end time: 5:49 PM, between 5:50 PM and 5:54 PM discussed over the phone given  delayed audio towards end of visit.    Doximity utilized for video call.    Patient location for video visit: Home     Provider location for video visit:  Home (working remotely)        Summary/Assessment:    History that includes Sjogren's presenting with RTA type II, multiple joint pains.    Presents for follow-up visit.    States joint pains and muscle aches have been stable.    States sees nephrology regularly, has upcoming appointment.  Nephrology has been prescribing potassium and sodium bicarbonate, states potassium levels have been stable.    Patient still has some lightheadedness and fatigue, questions if related to Plaquenil.    Nausea has improved.    Liver enzymes have normalized now that she is off of Imuran.    Through medical messaging discussed possibility of trying methotrexate instead of Plaquenil.    Elevated IgA cardiolipin antibody x2, IgM cardiolipin antibody x1 and beta-2 glycoprotein IgM.    Please see below for management plan.      From prior note:     -  On exam noted to have some tenderness with some subtle fullness involving right third through fifth PIPs, consistent with having inflammatory arthritis perhaps Sjogren's related.  May also have DJD component given mild hypertrophic changes.  Admits to typing over many years.    - Presented on initial visit with after being discharged from hospital in October 2020.    Patient explains that she developed weakness, fatigue and muscle aches a few days to a week after a longer than typical hike.  In the emergency room she was noted to have a low potassium, low bicarb and metabolic acidosis.  Patient was seen by nephrology.  Work-up included some autoimmune markers and was noted to have elevated SSA/SSB antibodies.  Patient diagnosed with type II RTA.  Has been on potassium supplements and the potassium level has improved/remained stable since.  Last checked October 20, 2020.  Presented to the ER on October 12, 2020.  Has never experienced  similar episode in the past.    Has some dry mouth, denies having any dry eyes, dry skin or vaginal dryness.    States currently doing well just some mild weakness compared to baseline, not experiencing significant weakness, fatigue and muscle aches.    Has occasional joint pains involving fingers, wrists, elbows and ankles.  Hand pains mainly over PIPs.  On video exam no clear signs of synovitis appreciated involving hands and wrists.    States did not receiving steroids in the hospital.    Has a follow-up appointment with neurology towards the end of December 2020.        Pertinent rheumatology/past medical history (please refer to above for more detailed history):      Type II renal tubular acidosis (presented with hypokalemia, low bicarb, metabolic acidosis, likely secondary to Sjogren's, positive SSA/SSB antibodies.    Chronic multiple joint pains (hands, wrist, elbows ankles)    Inflammatory arthritis (right hand, subtle particularly third through fifth PIPs)    Hand pains, right greater than left    Right trochanteric bursitis    Microalbuminuria    Elevated IgA cardiolipin antibody x2, IgM cardiolipin antibody x1 and beta-2 glycoprotein IgM.      Rheumatology medications provided/suggested:    Tylenol  Plaquenil    Pertinent medication from other providers or from otc (please refer to above for more detailed med list):    Potassium 20 mEq  Sodium bicarb      Pertinent medications already tried:     Advil  Imuran (elevated liver enzymes)    Pertinent lab history:        Had low: Potassium and CO2    Elevated/positive: ERVIN, SSA/SSB antibodies, CK (lately normal), urine microalbumin.    Elevated: IgA cardiolipin antibody x2, IgM cardiolipin antibody x1 and beta-2 glycoprotein IgM.    Negative/unremarkable: Rheumatoid factor, CCP antibody, other ERVIN related subsets, lupus anticoagulant, creatinine, serum immunofixation, glucose, TSH.    Normal TPMT level.    Pertinent imaging/test  history:          Other:  , 1 child, working: [employer], no alcohol beverage, none smoker,  No recreational drug used.    States weighs 148 pounds.    On prior visit, deferred right trochanteric bursa cortisone injection.    Plan:      Given some fatigue and lightheadedness described recommend decreasing Plaquenil from 200 mg twice a day to alternating dose of 200 mg 1 day followed by 200 mg twice a day continuing this pattern every other day.  If still experiencing symptoms despite this trial for 2 weeks then recommend decreasing to 200 mg once a day.  If symptoms persist despite trying 200 mg once a day for few weeks, then can contact us and we can consider trying methotrexate instead (already discussed over medical messaging on April 27, 2021).    Recommend seeing ophthalmology within 3 to 6 months of starting Plaquenil, if need referral, can contact us for referral.    Suggest that she inquire from nephrologist if okay to take 1 baby aspirin daily or 1 tablet every other day due to some elevated phospholipid antibodies, for preventative measures.  No prior history of blood clots.    Patient expressed understanding and agreement to the above, also noted on AVS.    Continue following through with recommendations provided by nephrology, has upcoming follow-up.    Avoid Advil, all NSAIDs.    For pain relief can take Tylenol 500-1000 mg twice daily as needed.    Already made aware if experiences similar episode of muscle aches, weakness, fatigue recommend she go to the emergency room.    Avoid rigorous activities, long hikes for now.    Recommend drinking sufficient amount of fluids daily (8 cups of 8 ounces).    Did not pursue x-rays of: Bilateral hands and right hip/pelvis, as doing better.    Check labs a few days prior to next visit.      Follow-up in 4 months, in clinic (patient has very limited video access)..      Total time spent 45 minutes involved with patient care, includes placing orders,  reviewing records and formulating management plan.    This note was transcribed using Dragon voice recognition software as a result unintentional grammatical errors or word substitutions may have occurred. Please contact our Rheumatology department if you need any clarification or if you have any related inquiries.        Placido Herbert DO ....................  8/3/2021   12:45 PM

## 2021-08-03 NOTE — PATIENT INSTRUCTIONS
Summary of Your Rheumatology Visit    Next Appointment:  4 Months, in clinic    Medications:    Given some fatigue and lightheadedness described recommend decreasing Plaquenil from 200 mg twice a day to alternating dose of 200 mg 1 day followed by 200 mg twice a day continuing this pattern every other day.  If still experiencing symptoms despite this trial for 2 weeks then recommend decreasing to 200 mg once a day.  If symptoms persist despite trying 200 mg once a day for few weeks, then can contact us and we can consider trying methotrexate instead.    Recommend seeing ophthalmology within 3 to 6 months of starting Plaquenil, if need referral, can contact us for referral.    Please inquire from nephrologist if okay to take 1 baby aspirin daily or 1 tablet every other day due to some elevated phospholipid antibodies, for preventative measures.  No prior history of blood clots.    Referrals:      Tests:     Recommend checking labs a few days prior to next visit.    Injections:      Other:

## 2021-09-03 LAB
CREATININE (EXTERNAL): 0.92 MG/DL (ref 0.55–1.02)
GFR ESTIMATED (EXTERNAL): >60 ML/MIN
GFR ESTIMATED (IF AFRICAN AMERICAN) (EXTERNAL): >60 ML/MIN
GLUCOSE (EXTERNAL): 82 MG/DL (ref 74–106)
POTASSIUM (EXTERNAL): 4.5 MMOL/L (ref 3.5–5.1)

## 2021-10-16 ENCOUNTER — HEALTH MAINTENANCE LETTER (OUTPATIENT)
Age: 46
End: 2021-10-16

## 2021-10-17 ENCOUNTER — MYC REFILL (OUTPATIENT)
Dept: FAMILY MEDICINE | Facility: CLINIC | Age: 46
End: 2021-10-17

## 2021-10-17 DIAGNOSIS — E87.6 HYPOKALEMIA: ICD-10-CM

## 2021-10-25 ENCOUNTER — MYC REFILL (OUTPATIENT)
Dept: FAMILY MEDICINE | Facility: CLINIC | Age: 46
End: 2021-10-25

## 2021-10-25 DIAGNOSIS — E87.6 HYPOKALEMIA: ICD-10-CM

## 2021-10-26 RX ORDER — POTASSIUM CHLORIDE 1.5 G/1.58G
20 POWDER, FOR SOLUTION ORAL 2 TIMES DAILY
Qty: 180 PACKET | Refills: 1 | OUTPATIENT
Start: 2021-10-26

## 2021-10-27 NOTE — TELEPHONE ENCOUNTER
" Disp Refills Start End ILDA   potassium chloride (KLOR-CON) 20 mEq packet 180 packet 3 5/20/2021  No   Sig - Route: Take 20 mEq by mouth 2 (two) times a day. - Oral   Sent to pharmacy as: potassium chloride 20 mEq oral packet (KLOR-CON)   E-Prescribing Status: Receipt confirmed by pharmacy (5/20/2021  3:09 PM CDT)     Should have refills on file. Receipt confirmed by pharmacy on 05/20/2021 for 1 year RX.  Same pharmacy information.    Last Written Prescription Date:  05/20/2021  Last Fill Quantity: 180,  # refills: 3   Last office visit provider:  04/07/2021 with Dr Mar.    Requested Prescriptions   Pending Prescriptions Disp Refills     Potassium Chloride (GEN-K) 20 MEQ PACK       Sig: Take 20 mEq by mouth 2 times daily       Potassium Supplements Protocol Passed - 10/25/2021 12:35 PM        Passed - Recent (12 mo) or future (30 days) visit within the authorizing provider's department     Patient has had an office visit with the authorizing provider or a provider within the authorizing providers department within the previous 12 mos or has a future within next 30 days. See \"Patient Info\" tab in inbasket, or \"Choose Columns\" in Meds & Orders section of the refill encounter.              Passed - Medication is active on med list        Passed - Patient is age 18 or older        Passed - Normal serum potassium in past 12 months     Recent Labs   Lab Test 04/23/21  1027   POTASSIUM 4.7                         Rosario Lara 10/26/21 10:46 PM  "

## 2021-10-28 ENCOUNTER — MYC MEDICAL ADVICE (OUTPATIENT)
Dept: RHEUMATOLOGY | Facility: CLINIC | Age: 46
End: 2021-10-28

## 2021-10-28 NOTE — TELEPHONE ENCOUNTER
I called the pt and informed that I reviewed her sx with the MD who advises she seek emergency care if she cannot get in with an eye doctor today. Pt understands.

## 2021-10-28 NOTE — TELEPHONE ENCOUNTER
I called the pt, she states that the blurry vision in the R eye started 1 hr ago and is very blurry even with glasses on. The pt also has pain behind eye, the eyelid is puffy, however denies jaw pain. The pt is not sure if she has a headache or just pain behind her eye. No other pain. I will have the MD review and advise.

## 2021-12-27 ENCOUNTER — TELEPHONE (OUTPATIENT)
Dept: RHEUMATOLOGY | Facility: CLINIC | Age: 46
End: 2021-12-27
Payer: COMMERCIAL

## 2021-12-27 DIAGNOSIS — M35.04 SJOGREN'S SYNDROME WITH TUBULO-INTERSTITIAL NEPHROPATHY (H): ICD-10-CM

## 2021-12-27 DIAGNOSIS — M19.90 INFLAMMATORY ARTHRITIS: ICD-10-CM

## 2021-12-27 NOTE — TELEPHONE ENCOUNTER
Refill      Methotrexate 2.5 mg  Hydroxychloroquine 200 mg    Last ov: 8/3/21  Last lab: 6/23/21  Next ov: none

## 2021-12-30 DIAGNOSIS — R91.8 GROUND GLASS OPACITY PRESENT ON IMAGING OF LUNG: ICD-10-CM

## 2021-12-30 DIAGNOSIS — M35.04 SJOGREN'S SYNDROME WITH TUBULO-INTERSTITIAL NEPHROPATHY (H): Primary | ICD-10-CM

## 2022-01-05 ENCOUNTER — OFFICE VISIT (OUTPATIENT)
Dept: PULMONOLOGY | Facility: OTHER | Age: 47
End: 2022-01-05
Attending: INTERNAL MEDICINE
Payer: COMMERCIAL

## 2022-01-05 VITALS
BODY MASS INDEX: 33.04 KG/M2 | DIASTOLIC BLOOD PRESSURE: 77 MMHG | OXYGEN SATURATION: 99 % | SYSTOLIC BLOOD PRESSURE: 121 MMHG | HEART RATE: 61 BPM | HEIGHT: 61 IN | WEIGHT: 175 LBS

## 2022-01-05 DIAGNOSIS — M35.04 SJOGREN'S SYNDROME WITH TUBULO-INTERSTITIAL NEPHROPATHY (H): ICD-10-CM

## 2022-01-05 DIAGNOSIS — R91.8 GROUND GLASS OPACITY PRESENT ON IMAGING OF LUNG: ICD-10-CM

## 2022-01-05 PROCEDURE — 99204 OFFICE O/P NEW MOD 45 MIN: CPT | Performed by: INTERNAL MEDICINE

## 2022-01-05 RX ORDER — LIDOCAINE 40 MG/G
CREAM TOPICAL
Status: CANCELLED | OUTPATIENT
Start: 2022-01-05

## 2022-01-05 ASSESSMENT — MIFFLIN-ST. JEOR: SCORE: 1361.17

## 2022-01-05 NOTE — PROGRESS NOTES
Pulmonary Clinic Outpatient Consultation    Assessment and Plan:   47 yo F with a history of Sjogrens disease, inflammatory arthritis who follows with rheumatology, who presents today for abnormal pulmonary imaging. She has been treated with plaquenil, and methotrexate which was started at the end of December. She took this for 1 week before being instructed to stop. She has also been off plaquenil due to other symptoms such as dizziness. She denies at any point having respiratory symptoms - no cough, no shortness of breath. She had an abnormal CXR with parenchymal abnormalities that predates treatment with methotrexate, which was started for 1 week at the end of December and then stopped when CT scan 12/28 confirmed bilateral consolidative GGOs. She has no systemic or respiratory symptoms.     Primary suspicion is for inflammatory pneumonia/ILD given common association with her Sjogrens (OP, NSIP, UIP or LIP). No cystic lesions, and overall radiographically looks most consistent with organizing pneumonia. Given time course (findings present prior to initiation of methotrexate) it is unlikely that this is the culprit, and findings are inconsistent with drug induced pneumonitis.       Recommendations:    Bronchoscopy with BAL to rule out infections, and send for cell differential, cytology.    Baseline PFTs    Would hold methotrexate until lung process is further clarified. This is unlikely to be the cause, but would want to avoid any further clouding of the respiratory picture.     Follow up with me with above testing results. Consideration of steroid treatment pending these.      Fern Álvarez MD  Pulmonary and Critical Care Medicine  [facility]  Office: [phone]    ----------------------    Reason for Consult: Abnormal CT scan    HPI:   47 yo F with a history of Sjogrens disease, inflammatory arthritis who follows with rheumatology, who presents today for abnormal pulmonary imaging. She has been  treated with plaquenil, and methotrexate which was started at the end of December. She took this for 1 week before being instructed to stop. She has also been off plaquenil due to other symptoms such as dizziness. She denies at any point having respiratory symptoms - no cough, no shortness of breath.     CXR 11/23 has abnormal parenchymal findings, further clarified on CT scan 1 month later.   No labs/testing since these findings.     Follows with renal, type II RTA    Some seasonal asthma type in childhood, no formal diagnosis/symptoms since then.   Never smoker, no other inhaled substances (e cig, vaping, marijuana)      ROS:  A 12-system review was obtained and was negative with the exception of the symptoms endorsed in the history of present illness.    PMH:  Past Medical History:   Diagnosis Date     Anxiety      Depression    Sjogren's    PSH:  Past Surgical History:   Procedure Laterality Date     UTERINE FIBROID SURGERY       ZZC EXCIS UTERINE FIBROID,ABD APPRCH      Description: Uterine Myomectomy;  Recorded: 05/05/2014;     Allergies:  Allergies   Allergen Reactions     Codeine Other (See Comments)     Tylenol 3, stomach pains that resulted in ER visit     Grass Unknown     Family HX:  Family History   Problem Relation Age of Onset     Arthritis Mother      Cancer Mother      Depression Mother      Hearing Loss Mother      Vision Loss Mother      Cancer Father         leukemia      Hearing Loss Father      Asthma Paternal Aunt      Diabetes Maternal Grandfather      Hypertension Maternal Grandfather      Arthritis Paternal Grandmother      Heart Disease Paternal Grandmother      Hypertension Paternal Grandmother      Kidney Disease Paternal Grandmother      Mental Illness Paternal Grandmother      Alcoholism Paternal Grandfather      Arthritis Paternal Grandfather      Asthma Paternal Grandfather      Clotting Disorder Paternal Grandfather      Dementia Paternal Grandfather      Depression Paternal  "Grandfather      Social Hx:  Social History     Socioeconomic History     Marital status:      Spouse name: Not on file     Number of children: Not on file     Years of education: Not on file     Highest education level: Not on file   Occupational History     Not on file   Tobacco Use     Smoking status: Never Smoker     Smokeless tobacco: Never Used   Substance and Sexual Activity     Alcohol use: No     Drug use: No     Sexual activity: Not Currently     Partners: Male     Birth control/protection: None   Other Topics Concern     Not on file   Social History Narrative     Not on file     Social Determinants of Health     Financial Resource Strain: Not on file   Food Insecurity: Not on file   Transportation Needs: Not on file   Physical Activity: Not on file   Stress: Not on file   Social Connections: Not on file   Intimate Partner Violence: Not on file   Housing Stability: Not on file   , 1 child, working: [employer], no alcohol beverage, none smoker, no recreational drug used.  Tobacco: never smoker    Current Meds:  Reviewed    Physical Exam:  /77   Pulse 61   Ht 1.549 m (5' 1\")   Wt 79.4 kg (175 lb)   SpO2 99%   BMI 33.07 kg/m    Gen: Alert, oriented, no distress  HEENT: nasal turbinates are unremarkable, no oropharyngeal lesions, no cervical or supraclavicular lymphadenopathy  CV: RRR, no M/G/R  Resp: CTAB, no focal crackles or wheezes  Abd: soft, nontender, no palpable organomegaly  Skin: no apparent rashes  Ext: no cyanosis, clubbing or edema  Neuro: alert, nonfocal    Labs:  Reviewed    Imaging studies:  Personally reviewed and interpreted:    11/23/21 CXR:  1. Small nodular opacities left upper lobe and right lower lung field which are indeterminate for pulmonary nodules versus summation shadows. CT could further evaluate.  2. No acute infiltrates or congestive failure.      12/28/21 CT Chest:  LUNGS AND PLEURA: Ground-glass density and consolidative multifocal air-space opacities " in a peribronchovascular and peripheral distribution in the mid and lower lungs with about 25% involvement of left mid and lower lung and less than 25% involvement of the right mid and lower lung. No pleural effusion.     MEDIASTINUM/AXILLAE: No lymphadenopathy. Heart size normal with no pericardial effusion.     CORONARY ARTERY CALCIFICATION: None.     UPPER ABDOMEN: No significant finding. Benign focal fatty infiltration medial segment left hepatic lobe requires no follow-up.     MUSCULOSKELETAL: A benign 15 x 12 x 8 mm focus of dense sclerosis anterolateral right 7th rib unchanged since 4/29/2015 CT abdomen accounts for the right lower lung nodule at recent chest radiograph. The normal left 1st costochondral junction accounts for the left upper nodule at recent chest radiograph.     IMPRESSION:  1.  No concerning lung nodules.  2.  Ground-glass density and consolidative focal airspace opacities in a peribronchovascular and peripheral distribution mid and lower lungs have developed. Commonly reported imaging features of (COVID-19) pneumonia are present. Influenza pneumonia and   organizing pneumonia as can be seen in the setting of drug toxicity and connective tissue disease can cause a similar imaging pattern.    PFT's   None

## 2022-01-11 ENCOUNTER — TELEPHONE (OUTPATIENT)
Dept: PULMONOLOGY | Facility: OTHER | Age: 47
End: 2022-01-11
Payer: COMMERCIAL

## 2022-01-11 LAB
CREATININE (EXTERNAL): 0.81 MG/DL (ref 0.57–1)
GFR ESTIMATED (EXTERNAL): 86 ML/MIN/1.73
GFR ESTIMATED (IF AFRICAN AMERICAN) (EXTERNAL): 99 ML/MIN/1.73
GLUCOSE (EXTERNAL): 81 MG/DL (ref 65–99)
POTASSIUM (EXTERNAL): 4 MMOL/L (ref 3.5–5.2)

## 2022-01-11 NOTE — TELEPHONE ENCOUNTER
Called patient to go over bronchoscopy education. Informed patient that her procedure is scheduled for 1/20 at 11:00am in the OR at [facility].  Instructed to arrive at 9:00am.  An IV will be placed and IV sedation will be given.  Biopsies may be done.  Instructed to have nothing to eat or drink after midnight.   Medication instructions: hold all morning medications  Stop Aspirin date:  Patient knows not to take  Stop Ibuprofen, NSAIDS, coxibs (ie:celebrex):  Patient knows not to take  Stop blood thinner date: n/a  Patient understands that they will need a ride home after the procedure and someone to stay with them at home after the procedure.  Patient had no further questions and is agreeable to procedure.  Phone number given for questions. Patient understands they are required to have COVID testing done prior to procedure.     COVID test scheduled for 1/17

## 2022-01-17 ENCOUNTER — LAB (OUTPATIENT)
Dept: LAB | Facility: CLINIC | Age: 47
End: 2022-01-17
Attending: INTERNAL MEDICINE
Payer: COMMERCIAL

## 2022-01-17 DIAGNOSIS — M35.04 SJOGREN'S SYNDROME WITH TUBULO-INTERSTITIAL NEPHROPATHY (H): ICD-10-CM

## 2022-01-17 DIAGNOSIS — R91.8 GROUND GLASS OPACITY PRESENT ON IMAGING OF LUNG: ICD-10-CM

## 2022-01-17 PROCEDURE — U0005 INFEC AGEN DETEC AMPLI PROBE: HCPCS

## 2022-01-17 PROCEDURE — U0003 INFECTIOUS AGENT DETECTION BY NUCLEIC ACID (DNA OR RNA); SEVERE ACUTE RESPIRATORY SYNDROME CORONAVIRUS 2 (SARS-COV-2) (CORONAVIRUS DISEASE [COVID-19]), AMPLIFIED PROBE TECHNIQUE, MAKING USE OF HIGH THROUGHPUT TECHNOLOGIES AS DESCRIBED BY CMS-2020-01-R: HCPCS

## 2022-01-18 ENCOUNTER — LAB (OUTPATIENT)
Dept: LAB | Facility: CLINIC | Age: 47
End: 2022-01-18
Payer: COMMERCIAL

## 2022-01-18 DIAGNOSIS — N25.89 TYPE II RTA: ICD-10-CM

## 2022-01-18 DIAGNOSIS — M35.04 SJOGREN'S SYNDROME WITH TUBULO-INTERSTITIAL NEPHROPATHY (H): ICD-10-CM

## 2022-01-18 LAB
ALBUMIN SERPL-MCNC: 3.8 G/DL (ref 3.5–5)
ALT SERPL W P-5'-P-CCNC: 17 U/L (ref 0–45)
ANION GAP SERPL CALCULATED.3IONS-SCNC: 10 MMOL/L (ref 5–18)
AST SERPL W P-5'-P-CCNC: 15 U/L (ref 0–40)
BASOPHILS # BLD AUTO: 0 10E3/UL (ref 0–0.2)
BASOPHILS NFR BLD AUTO: 0 %
BUN SERPL-MCNC: 14 MG/DL (ref 8–22)
CALCIUM SERPL-MCNC: 9.2 MG/DL (ref 8.5–10.5)
CHLORIDE BLD-SCNC: 108 MMOL/L (ref 98–107)
CK SERPL-CCNC: 41 U/L (ref 30–190)
CO2 SERPL-SCNC: 19 MMOL/L (ref 22–31)
CREAT SERPL-MCNC: 0.85 MG/DL (ref 0.6–1.1)
EOSINOPHIL # BLD AUTO: 0.1 10E3/UL (ref 0–0.7)
EOSINOPHIL NFR BLD AUTO: 2 %
ERYTHROCYTE [DISTWIDTH] IN BLOOD BY AUTOMATED COUNT: 15.8 % (ref 10–15)
GFR SERPL CREATININE-BSD FRML MDRD: 84 ML/MIN/1.73M2
GLUCOSE BLD-MCNC: 81 MG/DL (ref 70–125)
HCT VFR BLD AUTO: 40.7 % (ref 35–47)
HGB BLD-MCNC: 13 G/DL (ref 11.7–15.7)
IMM GRANULOCYTES # BLD: 0 10E3/UL
IMM GRANULOCYTES NFR BLD: 0 %
LYMPHOCYTES # BLD AUTO: 1.1 10E3/UL (ref 0.8–5.3)
LYMPHOCYTES NFR BLD AUTO: 21 %
MCH RBC QN AUTO: 28.7 PG (ref 26.5–33)
MCHC RBC AUTO-ENTMCNC: 31.9 G/DL (ref 31.5–36.5)
MCV RBC AUTO: 90 FL (ref 78–100)
MONOCYTES # BLD AUTO: 0.7 10E3/UL (ref 0–1.3)
MONOCYTES NFR BLD AUTO: 13 %
NEUTROPHILS # BLD AUTO: 3.4 10E3/UL (ref 1.6–8.3)
NEUTROPHILS NFR BLD AUTO: 65 %
PLATELET # BLD AUTO: 230 10E3/UL (ref 150–450)
POTASSIUM BLD-SCNC: 4.5 MMOL/L (ref 3.5–5)
RBC # BLD AUTO: 4.53 10E6/UL (ref 3.8–5.2)
SARS-COV-2 RNA RESP QL NAA+PROBE: NEGATIVE
SODIUM SERPL-SCNC: 137 MMOL/L (ref 136–145)
WBC # BLD AUTO: 5.2 10E3/UL (ref 4–11)

## 2022-01-18 PROCEDURE — 84450 TRANSFERASE (AST) (SGOT): CPT

## 2022-01-18 PROCEDURE — 36415 COLL VENOUS BLD VENIPUNCTURE: CPT

## 2022-01-18 PROCEDURE — 82040 ASSAY OF SERUM ALBUMIN: CPT

## 2022-01-18 PROCEDURE — 80048 BASIC METABOLIC PNL TOTAL CA: CPT

## 2022-01-18 PROCEDURE — 85025 COMPLETE CBC W/AUTO DIFF WBC: CPT

## 2022-01-18 PROCEDURE — 82550 ASSAY OF CK (CPK): CPT

## 2022-01-18 PROCEDURE — 84460 ALANINE AMINO (ALT) (SGPT): CPT

## 2022-01-20 ENCOUNTER — ANESTHESIA EVENT (OUTPATIENT)
Dept: SURGERY | Facility: HOSPITAL | Age: 47
End: 2022-01-20
Payer: COMMERCIAL

## 2022-01-20 ENCOUNTER — HOSPITAL ENCOUNTER (OUTPATIENT)
Facility: HOSPITAL | Age: 47
Discharge: HOME | End: 2022-01-20
Attending: INTERNAL MEDICINE | Admitting: INTERNAL MEDICINE
Payer: COMMERCIAL

## 2022-01-20 ENCOUNTER — ANESTHESIA (OUTPATIENT)
Dept: SURGERY | Facility: HOSPITAL | Age: 47
End: 2022-01-20
Payer: COMMERCIAL

## 2022-01-20 VITALS
WEIGHT: 177.6 LBS | TEMPERATURE: 98.9 F | BODY MASS INDEX: 33.53 KG/M2 | SYSTOLIC BLOOD PRESSURE: 136 MMHG | RESPIRATION RATE: 27 BRPM | DIASTOLIC BLOOD PRESSURE: 64 MMHG | HEIGHT: 61 IN | HEART RATE: 64 BPM | OXYGEN SATURATION: 97 %

## 2022-01-20 LAB
APPEARANCE FLD: ABNORMAL
COLOR FLD: ABNORMAL
GRAM STAIN RESULT: NORMAL
GRAM STAIN RESULT: NORMAL
KOH PREPARATION: NORMAL
KOH PREPARATION: NORMAL
LYMPHOCYTES NFR FLD MANUAL: 7 %
MONOS+MACROS NFR FLD MANUAL: 75 %
NEUTS BAND NFR FLD MANUAL: 18 %

## 2022-01-20 PROCEDURE — 89051 BODY FLUID CELL COUNT: CPT | Performed by: INTERNAL MEDICINE

## 2022-01-20 PROCEDURE — 31624 DX BRONCHOSCOPE/LAVAGE: CPT | Performed by: INTERNAL MEDICINE

## 2022-01-20 PROCEDURE — 250N000011 HC RX IP 250 OP 636: Performed by: NURSE ANESTHETIST, CERTIFIED REGISTERED

## 2022-01-20 PROCEDURE — 87070 CULTURE OTHR SPECIMN AEROBIC: CPT | Mod: XS | Performed by: INTERNAL MEDICINE

## 2022-01-20 PROCEDURE — 87486 CHLMYD PNEUM DNA AMP PROBE: CPT | Performed by: INTERNAL MEDICINE

## 2022-01-20 PROCEDURE — 258N000003 HC RX IP 258 OP 636: Performed by: ANESTHESIOLOGY

## 2022-01-20 PROCEDURE — 87581 M.PNEUMON DNA AMP PROBE: CPT | Performed by: INTERNAL MEDICINE

## 2022-01-20 PROCEDURE — 360N000076 HC SURGERY LEVEL 3, PER MIN: Performed by: INTERNAL MEDICINE

## 2022-01-20 PROCEDURE — 87116 MYCOBACTERIA CULTURE: CPT | Performed by: INTERNAL MEDICINE

## 2022-01-20 PROCEDURE — 87206 SMEAR FLUORESCENT/ACID STAI: CPT | Mod: XU | Performed by: INTERNAL MEDICINE

## 2022-01-20 PROCEDURE — 87205 SMEAR GRAM STAIN: CPT | Performed by: INTERNAL MEDICINE

## 2022-01-20 PROCEDURE — 710N000009 HC RECOVERY PHASE 1, LEVEL 1, PER MIN: Performed by: INTERNAL MEDICINE

## 2022-01-20 PROCEDURE — 87210 SMEAR WET MOUNT SALINE/INK: CPT | Performed by: INTERNAL MEDICINE

## 2022-01-20 PROCEDURE — 250N000009 HC RX 250: Performed by: NURSE ANESTHETIST, CERTIFIED REGISTERED

## 2022-01-20 PROCEDURE — 999N000157 HC STATISTIC RCP TIME EA 10 MIN

## 2022-01-20 PROCEDURE — 87102 FUNGUS ISOLATION CULTURE: CPT | Performed by: INTERNAL MEDICINE

## 2022-01-20 PROCEDURE — 84999 UNLISTED CHEMISTRY PROCEDURE: CPT | Performed by: INTERNAL MEDICINE

## 2022-01-20 PROCEDURE — 370N000017 HC ANESTHESIA TECHNICAL FEE, PER MIN: Performed by: INTERNAL MEDICINE

## 2022-01-20 PROCEDURE — 999N000141 HC STATISTIC PRE-PROCEDURE NURSING ASSESSMENT: Performed by: INTERNAL MEDICINE

## 2022-01-20 PROCEDURE — 87633 RESP VIRUS 12-25 TARGETS: CPT | Performed by: INTERNAL MEDICINE

## 2022-01-20 PROCEDURE — 710N000012 HC RECOVERY PHASE 2, PER MINUTE: Performed by: INTERNAL MEDICINE

## 2022-01-20 PROCEDURE — 87081 CULTURE SCREEN ONLY: CPT | Performed by: INTERNAL MEDICINE

## 2022-01-20 PROCEDURE — 89050 BODY FLUID CELL COUNT: CPT | Performed by: INTERNAL MEDICINE

## 2022-01-20 PROCEDURE — 87798 DETECT AGENT NOS DNA AMP: CPT | Performed by: INTERNAL MEDICINE

## 2022-01-20 PROCEDURE — 88305 TISSUE EXAM BY PATHOLOGIST: CPT | Mod: TC | Performed by: INTERNAL MEDICINE

## 2022-01-20 RX ORDER — ONDANSETRON 2 MG/ML
4 INJECTION INTRAMUSCULAR; INTRAVENOUS EVERY 6 HOURS PRN
Status: DISCONTINUED | OUTPATIENT
Start: 2022-01-20 | End: 2022-01-20 | Stop reason: HOSPADM

## 2022-01-20 RX ORDER — PROPOFOL 10 MG/ML
INJECTION, EMULSION INTRAVENOUS CONTINUOUS PRN
Status: DISCONTINUED | OUTPATIENT
Start: 2022-01-20 | End: 2022-01-20

## 2022-01-20 RX ORDER — FENTANYL CITRATE 50 UG/ML
25 INJECTION, SOLUTION INTRAMUSCULAR; INTRAVENOUS EVERY 5 MIN PRN
Status: DISCONTINUED | OUTPATIENT
Start: 2022-01-20 | End: 2022-01-20 | Stop reason: HOSPADM

## 2022-01-20 RX ORDER — HYDROMORPHONE HCL IN WATER/PF 6 MG/30 ML
0.2 PATIENT CONTROLLED ANALGESIA SYRINGE INTRAVENOUS EVERY 5 MIN PRN
Status: DISCONTINUED | OUTPATIENT
Start: 2022-01-20 | End: 2022-01-20 | Stop reason: HOSPADM

## 2022-01-20 RX ORDER — FLUMAZENIL 0.1 MG/ML
0.2 INJECTION, SOLUTION INTRAVENOUS
Status: DISCONTINUED | OUTPATIENT
Start: 2022-01-20 | End: 2022-01-20 | Stop reason: HOSPADM

## 2022-01-20 RX ORDER — NALOXONE HYDROCHLORIDE 1 MG/ML
0.2 INJECTION INTRAMUSCULAR; INTRAVENOUS; SUBCUTANEOUS
Status: DISCONTINUED | OUTPATIENT
Start: 2022-01-20 | End: 2022-01-20 | Stop reason: HOSPADM

## 2022-01-20 RX ORDER — NALOXONE HYDROCHLORIDE 1 MG/ML
0.4 INJECTION INTRAMUSCULAR; INTRAVENOUS; SUBCUTANEOUS
Status: DISCONTINUED | OUTPATIENT
Start: 2022-01-20 | End: 2022-01-20 | Stop reason: HOSPADM

## 2022-01-20 RX ORDER — HALOPERIDOL 5 MG/ML
1 INJECTION INTRAMUSCULAR
Status: DISCONTINUED | OUTPATIENT
Start: 2022-01-20 | End: 2022-01-20 | Stop reason: HOSPADM

## 2022-01-20 RX ORDER — SODIUM CHLORIDE, SODIUM LACTATE, POTASSIUM CHLORIDE, CALCIUM CHLORIDE 600; 310; 30; 20 MG/100ML; MG/100ML; MG/100ML; MG/100ML
INJECTION, SOLUTION INTRAVENOUS CONTINUOUS
Status: DISCONTINUED | OUTPATIENT
Start: 2022-01-20 | End: 2022-01-20 | Stop reason: HOSPADM

## 2022-01-20 RX ORDER — FENTANYL CITRATE 50 UG/ML
25 INJECTION, SOLUTION INTRAMUSCULAR; INTRAVENOUS
Status: DISCONTINUED | OUTPATIENT
Start: 2022-01-20 | End: 2022-01-20 | Stop reason: HOSPADM

## 2022-01-20 RX ORDER — DEXAMETHASONE SODIUM PHOSPHATE 10 MG/ML
INJECTION, SOLUTION INTRAMUSCULAR; INTRAVENOUS PRN
Status: DISCONTINUED | OUTPATIENT
Start: 2022-01-20 | End: 2022-01-20

## 2022-01-20 RX ORDER — ONDANSETRON 2 MG/ML
4 INJECTION INTRAMUSCULAR; INTRAVENOUS EVERY 30 MIN PRN
Status: DISCONTINUED | OUTPATIENT
Start: 2022-01-20 | End: 2022-01-20 | Stop reason: HOSPADM

## 2022-01-20 RX ORDER — LIDOCAINE 40 MG/G
CREAM TOPICAL
Status: DISCONTINUED | OUTPATIENT
Start: 2022-01-20 | End: 2022-01-20 | Stop reason: HOSPADM

## 2022-01-20 RX ORDER — FENTANYL CITRATE 50 UG/ML
INJECTION, SOLUTION INTRAMUSCULAR; INTRAVENOUS PRN
Status: DISCONTINUED | OUTPATIENT
Start: 2022-01-20 | End: 2022-01-20

## 2022-01-20 RX ORDER — LIDOCAINE HYDROCHLORIDE 20 MG/ML
INJECTION, SOLUTION INFILTRATION; PERINEURAL PRN
Status: DISCONTINUED | OUTPATIENT
Start: 2022-01-20 | End: 2022-01-20

## 2022-01-20 RX ORDER — ONDANSETRON 2 MG/ML
INJECTION INTRAMUSCULAR; INTRAVENOUS PRN
Status: DISCONTINUED | OUTPATIENT
Start: 2022-01-20 | End: 2022-01-20

## 2022-01-20 RX ORDER — OXYCODONE HYDROCHLORIDE 5 MG/1
5 TABLET ORAL EVERY 4 HOURS PRN
Status: DISCONTINUED | OUTPATIENT
Start: 2022-01-20 | End: 2022-01-20 | Stop reason: HOSPADM

## 2022-01-20 RX ORDER — PROPOFOL 10 MG/ML
INJECTION, EMULSION INTRAVENOUS PRN
Status: DISCONTINUED | OUTPATIENT
Start: 2022-01-20 | End: 2022-01-20

## 2022-01-20 RX ORDER — ONDANSETRON 4 MG/1
4 TABLET, ORALLY DISINTEGRATING ORAL EVERY 6 HOURS PRN
Status: DISCONTINUED | OUTPATIENT
Start: 2022-01-20 | End: 2022-01-20 | Stop reason: HOSPADM

## 2022-01-20 RX ORDER — ONDANSETRON 4 MG/1
4 TABLET, ORALLY DISINTEGRATING ORAL EVERY 30 MIN PRN
Status: DISCONTINUED | OUTPATIENT
Start: 2022-01-20 | End: 2022-01-20 | Stop reason: HOSPADM

## 2022-01-20 RX ORDER — MEPERIDINE HYDROCHLORIDE 25 MG/ML
12.5 INJECTION INTRAMUSCULAR; INTRAVENOUS; SUBCUTANEOUS
Status: DISCONTINUED | OUTPATIENT
Start: 2022-01-20 | End: 2022-01-20 | Stop reason: HOSPADM

## 2022-01-20 RX ADMIN — ROCURONIUM BROMIDE 40 MG: 50 INJECTION, SOLUTION INTRAVENOUS at 10:20

## 2022-01-20 RX ADMIN — SODIUM CHLORIDE, POTASSIUM CHLORIDE, SODIUM LACTATE AND CALCIUM CHLORIDE: 600; 310; 30; 20 INJECTION, SOLUTION INTRAVENOUS at 09:35

## 2022-01-20 RX ADMIN — ONDANSETRON 4 MG: 2 INJECTION INTRAMUSCULAR; INTRAVENOUS at 10:27

## 2022-01-20 RX ADMIN — LIDOCAINE HYDROCHLORIDE 60 MG: 20 INJECTION, SOLUTION INFILTRATION; PERINEURAL at 10:20

## 2022-01-20 RX ADMIN — MIDAZOLAM 2 MG: 1 INJECTION INTRAMUSCULAR; INTRAVENOUS at 10:15

## 2022-01-20 RX ADMIN — SUGAMMADEX 400 MG: 100 INJECTION, SOLUTION INTRAVENOUS at 10:36

## 2022-01-20 RX ADMIN — DEXAMETHASONE SODIUM PHOSPHATE 10 MG: 10 INJECTION, SOLUTION INTRAMUSCULAR; INTRAVENOUS at 10:27

## 2022-01-20 RX ADMIN — PROPOFOL 200 MCG/KG/MIN: 10 INJECTION, EMULSION INTRAVENOUS at 10:20

## 2022-01-20 RX ADMIN — FENTANYL CITRATE 100 MCG: 50 INJECTION, SOLUTION INTRAMUSCULAR; INTRAVENOUS at 10:20

## 2022-01-20 RX ADMIN — PROPOFOL 200 MG: 10 INJECTION, EMULSION INTRAVENOUS at 10:20

## 2022-01-20 ASSESSMENT — MIFFLIN-ST. JEOR: SCORE: 1372.97

## 2022-01-20 NOTE — PROGRESS NOTES
Respiratory Therapy Procedure Note  Jennyfer Terrazas is a 46 year old female     Assisted Dr. Cisneros during bronchoscopy.   Bronchoscopy procedure was done successfully without any complication.  BAL of LLL collected, samples sent. Airway managed by CRNA, pt. transported to PACU.      Fely Wills,  1/20/2022 11:18 AM

## 2022-01-20 NOTE — ANESTHESIA POSTPROCEDURE EVALUATION
Patient: Jennyfer Terrazas    Procedure: Procedure(s):  BRONCHOSCOPY       Diagnosis:Sjogren's syndrome with tubulo-interstitial nephropathy (H) [M35.04]  Ground glass opacity present on imaging of lung [R91.8]  Diagnosis Additional Information: No value filed.    Anesthesia Type:  General    Note:  Disposition: Outpatient   Postop Pain Control: Uneventful            Sign Out: Well controlled pain   PONV: No   Neuro/Psych: Uneventful            Sign Out: Acceptable/Baseline neuro status   Airway/Respiratory: Uneventful            Sign Out: Acceptable/Baseline resp. status   CV/Hemodynamics: Uneventful            Sign Out: Acceptable CV status; No obvious hypovolemia; No obvious fluid overload   Other NRE: NONE   DID A NON-ROUTINE EVENT OCCUR? No           Last vitals:  Vitals Value Taken Time   /66 01/20/22 1130   Temp 37.2  C (98.9  F) 01/20/22 1120   Pulse 73 01/20/22 1138   Resp 32 01/20/22 1138   SpO2 96 % 01/20/22 1138   Vitals shown include unvalidated device data.    Electronically Signed By: Te Fernandez MD  January 20, 2022  11:50 AM

## 2022-01-20 NOTE — ANESTHESIA PREPROCEDURE EVALUATION
Anesthesia Pre-Procedure Evaluation    Patient: Jennyfer Terrazas   MRN: [id number] : 1975        Preoperative Diagnosis: Sjogren's syndrome with tubulo-interstitial nephropathy (H) [M35.04]  Ground glass opacity present on imaging of lung [R91.8]    Procedure : Procedure(s):  BRONCHOSCOPY          Past Medical History:   Diagnosis Date     Anemia      Anxiety      Depression      Metabolic acidosis      Non-traumatic rhabdomyolysis      Sjogren's disease (H)      Vitamin B12 deficiency      Vitamin D deficiency       Past Surgical History:   Procedure Laterality Date     UTERINE FIBROID SURGERY       ZZC EXCIS UTERINE FIBROID,ABD APPRCH      Description: Uterine Myomectomy;  Recorded: 2014;      Allergies   Allergen Reactions     Codeine Other (See Comments)     Tylenol 3, stomach pains that resulted in ER visit     Grass Unknown      Social History     Tobacco Use     Smoking status: Never Smoker     Smokeless tobacco: Never Used   Substance Use Topics     Alcohol use: No      Wt Readings from Last 1 Encounters:   22 80.6 kg (177 lb 9.6 oz)        Anesthesia Evaluation   Pt has had prior anesthetic. Type: General.    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), Pt does not require dialysis,     Endo: Comment: sjogren's dz - neg endo ROS   (+) Obesity (bmi 33),     Psychiatric/Substance Use:  - neg psychiatric ROS   (+) psychiatric history anxiety and depression     Infectious Disease:  - neg infectious disease ROS     Malignancy:  - neg malignancy ROS     Other:  - neg other ROS          Physical Exam    Airway   airway exam normal      Mallampati: II   TM distance: > 3 FB   Neck ROM: full   Mouth opening: > 3 cm    Respiratory Devices and Support         Dental  no notable dental history         Cardiovascular   cardiovascular exam normal       Rhythm and rate: regular and normal     Pulmonary   pulmonary exam normal        breath sounds clear to auscultation           OUTSIDE LABS:  CBC:   Lab Results   Component Value Date    WBC 5.2 01/18/2022    WBC 5.4 06/23/2021    HGB 13.0 01/18/2022    HGB 12.4 06/23/2021    HCT 40.7 01/18/2022    HCT 37.8 06/23/2021     01/18/2022     06/23/2021     BMP:   Lab Results   Component Value Date     01/18/2022     04/23/2021    POTASSIUM 4.5 01/18/2022    POTASSIUM 4.7 04/23/2021    CHLORIDE 108 (H) 01/18/2022    CHLORIDE 108 (H) 04/23/2021    CO2 19 (L) 01/18/2022    CO2 23 04/23/2021    BUN 14 01/18/2022    BUN 17 04/23/2021    CR 0.85 01/18/2022    CR 0.84 06/23/2021    GLC 81 01/18/2022    GLC 79 04/23/2021     COAGS: No results found for: PTT, INR, FIBR  POC: No results found for: BGM, HCG, HCGS  HEPATIC:   Lab Results   Component Value Date    ALBUMIN 3.8 01/18/2022    PROTTOTAL 7.9 04/23/2021    ALT 17 01/18/2022    AST 15 01/18/2022    GGT 38 04/23/2021    ALKPHOS 114 04/23/2021    BILITOTAL 0.5 04/23/2021     OTHER:   Lab Results   Component Value Date    PH 7.32 (L) 10/13/2020    LACT 0.8 10/12/2020    KWABENA 9.2 01/18/2022    PHOS 1.9 (L) 10/14/2020    MAG 2.0 10/15/2020    TSH 2.01 10/13/2020    T3 75 02/19/2019    CRP 0.2 12/15/2020    SED 36 (H) 12/15/2020       Anesthesia Plan    ASA Status:  3   NPO Status:  NPO Appropriate    Anesthesia Type: General.     - Airway: ETT   Induction: Propofol, Intravenous.   Maintenance: TIVA.        Consents    Anesthesia Plan(s) and associated risks, benefits, and realistic alternatives discussed. Questions answered and patient/representative(s) expressed understanding.    - Discussed:     - Discussed with:   Patient         Postoperative Care    Pain management: Multi-modal analgesia.   PONV prophylaxis: Ondansetron (or other 5HT-3), Dexamethasone or Solumedrol     Comments:    Other Comments: Reviewed anesthetic options and risks, including risk of dental trauma. Patient agrees to proceed.     Recent Results (from the past 120 hour(s))  -Asymptomatic COVID-19 Virus (Coronavirus) by PCR Nose:   Collection Time: 01/17/22 10:52 AM  Specimen: Nose; Swab       Result                                            Value                         Ref Range                       SARS CoV2 PCR                                     Negative                      Negative, Testing sent t*            Te Fernandez MD

## 2022-01-20 NOTE — ANESTHESIA PROCEDURE NOTES
Airway       Patient location during procedure: OR       Procedure Start/Stop Times: 1/20/2022 10:23 AM  Staff -        Anesthesiologist:  Te Fernandez MD       CRNA: Jagjit Encinas APRN CRNA       Other Anesthesia Staff: Ethan Wynn       Performed By: SRNA  Consent for Airway        Urgency: elective  Indications and Patient Condition       Indications for airway management: amie-procedural       Induction type:intravenous       Mask difficulty assessment: 1 - vent by mask    Final Airway Details       Final airway type: endotracheal airway       Successful airway: ETT - single  Endotracheal Airway Details        ETT size (mm): 8.0       Cuffed: yes       Successful intubation technique: direct laryngoscopy       DL Blade Type: Cooper 2       Grade View of Cords: 1       Adjucts: stylet and tooth guard       Position: Right       Measured from: gums/teeth       Secured at (cm): 21       Bite block used: None    Post intubation assessment        Placement verified by: capnometry, equal breath sounds and chest rise        Number of attempts at approach: 1       Number of other approaches attempted: 0       Secured with: silk tape       Ease of procedure: easy       Dentition: Intact

## 2022-01-20 NOTE — PROCEDURES
Procedure:  Bronchoscopy  Indication:  Sjogren's syndrome, BL GGO  Providers: Shoshana Cisneros MD,   Medications:  See anesthesia flowsheet    Time Out:  Performed    The patient's medical record has been reviewed.  The indication for the procedure was reviewed and is Sjogren's syndrome and BL GGO.  The necessary history and physical examination was performed.  The risks, benefits and alternatives of the procedure were discussed with the the patient in detail and she had the opportunity to ask questions.  All questions were answered and verbal and written informed consent was obtained.  The proposed procedure and the patient's identification were verified prior to the procedure by the physician and the nurse and respiratory therapist.      The patient was assessed for the adequacy for the procedure and to receive medications.   ASA Grade: General appearance: alert, no acute distress  Eyes: Conjunctiva without erythema or mattering.  Ears: Tympanic membranes with good landmarks bilaterally.  Normal color.  Nose: Nares without erythema or drainage.  Throat: without erythema or exudate.  No tonsilar hypertrophy.  Neck: No lymphadenopathy or masses.  Lungs: clear to auscultation.  Heart: regular rate and rhythm without murmurs, rubs or gallops  Abdomen soft, non-distended, non-tender, no hepatosplenomegally.  Skin: no suspicious lesions or rashes    After clinical evaluation and reviewing the indication, risks, alternatives and benefits of the procedure the patient was deemed to be in satisfactory condition to undergo the procedure.      Immediately before administration of medications the patient was re-assessed for adequacy to receive sedatives including the heart rate, respiratory rate, mental status, oxygen saturation, blood pressure and adequacy of pulmonary ventilation. These same parameters were continuously monitored throughout the procedure.     Maneuvers / Procedure:   The flexible bronchoscope (Olympus) was  "introduced through\"Mouth via ETT\", 8.0 and a complete airway examination was performed from the distal trachea to the subsegmental level in each lobe of both lungs. There was no endobronchial lesion, there were Minimal secretions, and the mucosa was Normal appearing.      A BAL was performed in the LLL Lateral .  A total of 100 ml of fluid was instilled.   A total of 23 ml of fluid was returned.   The samples were combined and sent for immunocompromised battery.   Additional tests on fluid include: Cytology     Complications: None    Estimated Blood Loss: 0 ml    The patient tolerated the procedure well without undue discomfort, hypotension or arrhythmia, or hypoxia.    The procedure was performed in OR    The case is discussed with the patient's representative () after the procedure.     Shoshana Pendletonqvi  Pulmonary and Critical Care  [id number]      "

## 2022-01-20 NOTE — ANESTHESIA CARE TRANSFER NOTE
Patient: Jennyfer Terrazas    Procedure: Procedure(s):  BRONCHOSCOPY       Diagnosis: Sjogren's syndrome with tubulo-interstitial nephropathy (H) [M35.04]  Ground glass opacity present on imaging of lung [R91.8]  Diagnosis Additional Information: No value filed.    Anesthesia Type:   General     Note:    Oropharynx: oropharynx clear of all foreign objects and spontaneously breathing  Level of Consciousness: drowsy  Oxygen Supplementation: face mask  Level of Supplemental Oxygen (L/min / FiO2): 8  Independent Airway: airway patency satisfactory and stable  Dentition: dentition unchanged  Vital Signs Stable: post-procedure vital signs reviewed and stable  Report to RN Given: handoff report given  Patient transferred to: PACU    Handoff Report: Identifed the Patient, Identified the Reponsible Provider, Reviewed the pertinent medical history, Discussed the surgical course, Reviewed Intra-OP anesthesia mangement and issues during anesthesia, Set expectations for post-procedure period and Allowed opportunity for questions and acknowledgement of understanding      Vitals:  Vitals Value Taken Time   BP     Temp     Pulse     Resp     SpO2         Electronically Signed By: NICOLÁS Hess CRNA  January 20, 2022  10:52 AM

## 2022-01-21 LAB
Lab: NORMAL
PERFORMING LABORATORY: NORMAL
SPECIMEN STATUS: NORMAL
TEST NAME: NORMAL

## 2022-01-22 LAB
BACTERIA BRONCH: NORMAL
MAYO MISC RESULT: NORMAL

## 2022-01-24 LAB
PATH REPORT.COMMENTS IMP SPEC: NORMAL
PATH REPORT.FINAL DX SPEC: NORMAL
PATH REPORT.GROSS SPEC: NORMAL
PATH REPORT.MICROSCOPIC SPEC OTHER STN: NORMAL
PATH REPORT.RELEVANT HX SPEC: NORMAL

## 2022-01-24 PROCEDURE — 88112 CYTOPATH CELL ENHANCE TECH: CPT | Mod: 26 | Performed by: PATHOLOGY

## 2022-01-24 PROCEDURE — 88305 TISSUE EXAM BY PATHOLOGIST: CPT | Mod: 26 | Performed by: PATHOLOGY

## 2022-01-25 ENCOUNTER — TELEPHONE (OUTPATIENT)
Dept: PULMONOLOGY | Facility: OTHER | Age: 47
End: 2022-01-25
Payer: COMMERCIAL

## 2022-01-25 NOTE — TELEPHONE ENCOUNTER
Bronchoscopy results reviewed.   Mono/macrocytic predominant. Negative cytology. Micro negative thus far.    PFT scheduled 2/7, has follow up with me 2/21.    Concern remains for inflammatory ILD/OP, this would be mild given the patient was asymptomatic.     I called her to review, unable to reach her and left VM requesting call back.     If she continues to be symptom-free, we could consider ongoing monitoring rather than a course of steroids. In this case she could have her PFTs and follow up with me as scheduled and we can discuss repeat imaging at that time versus starting prednisone. I await her call back.      Fern Álvarez MD  Pulmonary and Critical Care Medicine  [facility]  Office: [phone]

## 2022-01-28 ENCOUNTER — TELEPHONE (OUTPATIENT)
Dept: PULMONOLOGY | Facility: OTHER | Age: 47
End: 2022-01-28
Payer: COMMERCIAL

## 2022-01-28 NOTE — TELEPHONE ENCOUNTER
Able to connect with Jennyfer via phone today an reviewed recommendations as outlined in previous encounter note. We plan to get her PFTs next month, follow up with me later in Feb with those. She remains asymptomatic, and we will hold off on steroids unless PFTs or repeat imaging later in Feb further support doing that.      Fern Álvarez MD  Pulmonary and Critical Care Medicine  [facility]  Office: [phone]

## 2022-02-03 LAB — BACTERIA BRONCH: NORMAL

## 2022-02-14 ENCOUNTER — TELEPHONE (OUTPATIENT)
Dept: RHEUMATOLOGY | Facility: CLINIC | Age: 47
End: 2022-02-14
Payer: COMMERCIAL

## 2022-02-14 ENCOUNTER — TELEPHONE (OUTPATIENT)
Dept: PULMONOLOGY | Facility: OTHER | Age: 47
End: 2022-02-14

## 2022-02-14 DIAGNOSIS — R91.8 GROUND GLASS OPACITY PRESENT ON IMAGING OF LUNG: Primary | ICD-10-CM

## 2022-02-14 NOTE — TELEPHONE ENCOUNTER
----- Message from Fern Álvarez MD sent at 2/14/2022  1:12 PM CST -----  Regarding: Ct chest  Hello,    Given the timing of this patient's follow up with me in March, I have ordered a CT chest for early March to be done prior to my visit March 15th.    Thank you!  Preeti

## 2022-02-15 ENCOUNTER — MYC REFILL (OUTPATIENT)
Dept: FAMILY MEDICINE | Facility: CLINIC | Age: 47
End: 2022-02-15
Payer: COMMERCIAL

## 2022-02-15 DIAGNOSIS — E87.6 HYPOKALEMIA: ICD-10-CM

## 2022-02-15 NOTE — TELEPHONE ENCOUNTER
MyceVotert message sent.    Patient states the 100 mg of Zoloft is working well.  Prescription was faxed.  Advised patient to call if the medication is not helping anymore.

## 2022-02-15 NOTE — TELEPHONE ENCOUNTER
Please mention to the patient:    Given that pulmonary desires to continue holding methotrexate for now, until reassessed in March.    Recommend patient perform the following labs in about 2 weeks for monitoring purposes: CBC with differential, urinalysis with microscopic, urine microalbumin level, CK, basic metabolic panel.

## 2022-02-17 LAB — BACTERIA BRONCH: NO GROWTH

## 2022-03-01 ENCOUNTER — HOSPITAL ENCOUNTER (OUTPATIENT)
Dept: RESPIRATORY THERAPY | Facility: CLINIC | Age: 47
Discharge: HOME | End: 2022-03-01
Attending: INTERNAL MEDICINE | Admitting: INTERNAL MEDICINE
Payer: COMMERCIAL

## 2022-03-01 DIAGNOSIS — R91.8 GROUND GLASS OPACITY PRESENT ON IMAGING OF LUNG: ICD-10-CM

## 2022-03-01 DIAGNOSIS — M35.04 SJOGREN'S SYNDROME WITH TUBULO-INTERSTITIAL NEPHROPATHY (H): ICD-10-CM

## 2022-03-01 LAB — HGB BLD-MCNC: 12.9 G/DL (ref 11.7–15.7)

## 2022-03-01 PROCEDURE — 85018 HEMOGLOBIN: CPT | Performed by: INTERNAL MEDICINE

## 2022-03-01 PROCEDURE — 94060 EVALUATION OF WHEEZING: CPT

## 2022-03-01 PROCEDURE — 250N000009 HC RX 250: Performed by: INTERNAL MEDICINE

## 2022-03-01 PROCEDURE — 94726 PLETHYSMOGRAPHY LUNG VOLUMES: CPT

## 2022-03-01 PROCEDURE — 94729 DIFFUSING CAPACITY: CPT

## 2022-03-01 PROCEDURE — 36415 COLL VENOUS BLD VENIPUNCTURE: CPT | Performed by: INTERNAL MEDICINE

## 2022-03-01 PROCEDURE — 999N000157 HC STATISTIC RCP TIME EA 10 MIN

## 2022-03-01 RX ORDER — ALBUTEROL SULFATE 0.83 MG/ML
2.5 SOLUTION RESPIRATORY (INHALATION) ONCE
Status: COMPLETED | OUTPATIENT
Start: 2022-03-01 | End: 2022-03-01

## 2022-03-01 RX ADMIN — ALBUTEROL SULFATE 2.5 MG: 2.5 SOLUTION RESPIRATORY (INHALATION) at 10:32

## 2022-03-01 NOTE — PROGRESS NOTES
RESPIRATORY CARE NOTE     Patient Name: Jennyfer Terrazas  Today's Date: 3/1/2022     Complete PFT done. Pt performed tests with good effort. Test results meet ATS criteria. Albuterol neb given. Results scanned into epic. Pt left in no distress.       Kristy Azul, RT

## 2022-03-02 ENCOUNTER — HOSPITAL ENCOUNTER (OUTPATIENT)
Dept: CT IMAGING | Facility: CLINIC | Age: 47
Discharge: HOME | End: 2022-03-02
Attending: INTERNAL MEDICINE | Admitting: INTERNAL MEDICINE
Payer: COMMERCIAL

## 2022-03-02 DIAGNOSIS — R91.8 GROUND GLASS OPACITY PRESENT ON IMAGING OF LUNG: ICD-10-CM

## 2022-03-02 PROCEDURE — 71250 CT THORAX DX C-: CPT

## 2022-03-08 LAB
DLCOCOR-%PRED-PRE: 94 %
DLCOCOR-PRE: 18.04 ML/MIN/MMHG
DLCOUNC-%PRED-PRE: 92 %
DLCOUNC-PRE: 17.76 ML/MIN/MMHG
DLCOUNC-PRED: 19.12 ML/MIN/MMHG
ERV-%PRED-PRE: 180 %
ERV-PRE: 1.09 L
ERV-PRED: 0.6 L
EXPTIME-PRE: 5.38 SEC
FEF2575-%PRED-POST: 173 %
FEF2575-%PRED-PRE: 164 %
FEF2575-POST: 4.58 L/SEC
FEF2575-PRE: 4.34 L/SEC
FEF2575-PRED: 2.64 L/SEC
FEFMAX-%PRED-PRE: 107 %
FEFMAX-PRE: 6.81 L/SEC
FEFMAX-PRED: 6.35 L/SEC
FEV1-%PRED-PRE: 101 %
FEV1-PRE: 2.58 L
FEV1FEV6-PRE: 89 %
FEV1FEV6-PRED: 83 %
FEV1FVC-PRE: 89 %
FEV1FVC-PRED: 81 %
FEV1SVC-PRE: 103 %
FEV1SVC-PRED: 81 %
FIFMAX-PRE: 4.79 L/SEC
FRCPLETH-%PRED-PRE: 78 %
FRCPLETH-PRE: 1.99 L
FRCPLETH-PRED: 2.52 L
FVC-%PRED-PRE: 92 %
FVC-PRE: 2.91 L
FVC-PRED: 3.14 L
IC-%PRED-PRE: 55 %
IC-PRE: 1.42 L
IC-PRED: 2.54 L
RVPLETH-%PRED-PRE: 56 %
RVPLETH-PRE: 0.89 L
RVPLETH-PRED: 1.57 L
TLCPLETH-%PRED-PRE: 76 %
TLCPLETH-PRE: 3.41 L
TLCPLETH-PRED: 4.44 L
VA-%PRED-PRE: 96 %
VA-PRE: 4.17 L
VC-%PRED-PRE: 80 %
VC-PRE: 2.51 L
VC-PRED: 3.14 L

## 2022-03-15 ENCOUNTER — OFFICE VISIT (OUTPATIENT)
Dept: PULMONOLOGY | Facility: OTHER | Age: 47
End: 2022-03-15
Payer: COMMERCIAL

## 2022-03-15 VITALS
OXYGEN SATURATION: 98 % | WEIGHT: 175 LBS | HEART RATE: 66 BPM | DIASTOLIC BLOOD PRESSURE: 80 MMHG | BODY MASS INDEX: 33.07 KG/M2 | SYSTOLIC BLOOD PRESSURE: 120 MMHG | RESPIRATION RATE: 16 BRPM

## 2022-03-15 DIAGNOSIS — R91.8 PULMONARY INFILTRATE: Primary | ICD-10-CM

## 2022-03-15 PROCEDURE — 99214 OFFICE O/P EST MOD 30 MIN: CPT | Performed by: INTERNAL MEDICINE

## 2022-03-15 NOTE — PROGRESS NOTES
Pulmonary Clinic Outpatient Progress Notes    Assessment and Plan:   45 yo F with a history of Sjogrens disease, inflammatory arthritis who follows with rheumatology, who presents today for follow up of lung infiltrates.     She has been treated with plaquenil, and methotrexate which was started at the end of December. She took this for 1 week before being instructed to stop. She has also been off plaquenil due to other symptoms such as dizziness. She denies at any point having respiratory symptoms - no cough, no shortness of breath. She had an abnormal CXR with parenchymal abnormalities that predates treatment with methotrexate, which was started for 1 week at the end of December and then stopped when CT scan 12/28 confirmed bilateral consolidative GGOs. She has had no systemic or respiratory symptoms.     Primary suspicion is for inflammatory pneumonia/ILD given common association with her Sjogrens (OP, NSIP, UIP or LIP). No cystic lesions, and overall radiographically looks most consistent with organizing pneumonia. Given time course (findings present prior to initiation of methotrexate) it is unlikely that this is the culprit.    She underwent bronchoscopy with BAL in 1/20, negative micro, cytology. Given lack of symptoms, hypoxia we monitored her rather than treating with steroids. She is here today to review follow up CT, which shows the parenchymal changes have resolved.       Recommendations:  Suspect this was mild inflammatory parenchymal disease, self-resolved. Given above, ie, these changes were present on CXR BEFORE starting the methotrexate, I don't feel this drug can be implicated. That being said, if there are other alternatives that have a lower risk profile for pneumonitis, those would be preferred. If not, this can be tried again, with close symptom monitoring and low threshold to scan.    This ma recur even in absence of a drug induced culprit.    Discussed with her in detail today.     She can  otherwise see me again if there are recurring issues.      Fern Álvarez MD  Pulmonary and Critical Care Medicine  [facility]  Office: [phone]    ----------------------    CC: Follow up lung infiltrates, post-bronchoscopy    Interval HPI:   Negative bronch studies  Repeat CT is now clear  Continues to remain asymptomatic.    ----------------  45 yo F with a history of Sjogrens disease, inflammatory arthritis who follows with rheumatology, who presents today for abnormal pulmonary imaging. She has been treated with plaquenil, and methotrexate which was started at the end of December. She took this for 1 week before being instructed to stop. She has also been off plaquenil due to other symptoms such as dizziness. She denies at any point having respiratory symptoms - no cough, no shortness of breath.     CXR 11/23 has abnormal parenchymal findings, further clarified on CT scan 1 month later.   No labs/testing since these findings.     Follows with renal, type II RTA    Some seasonal asthma type in childhood, no formal diagnosis/symptoms since then.   Never smoker, no other inhaled substances (e cig, vaping, marijuana)      ROS:  A 12-system review was obtained and was negative with the exception of the symptoms endorsed in the history of present illness.    PMH:  Past Medical History:   Diagnosis Date     Anemia      Anxiety      Depression      Metabolic acidosis      Non-traumatic rhabdomyolysis      Sjogren's disease (H)      Vitamin B12 deficiency      Vitamin D deficiency    Sjogren's    Social Hx:  Social History     Socioeconomic History     Marital status:      Spouse name: Not on file     Number of children: Not on file     Years of education: Not on file     Highest education level: Not on file   Occupational History     Not on file   Tobacco Use     Smoking status: Never Smoker     Smokeless tobacco: Never Used   Substance and Sexual Activity     Alcohol use: No     Drug use: No      Sexual activity: Not Currently     Partners: Male     Birth control/protection: None   Other Topics Concern     Parent/sibling w/ CABG, MI or angioplasty before 65F 55M? Not Asked   Social History Narrative     Not on file     Social Determinants of Health     Financial Resource Strain: Not on file   Food Insecurity: Not on file   Transportation Needs: Not on file   Physical Activity: Not on file   Stress: Not on file   Social Connections: Not on file   Intimate Partner Violence: Not on file   Housing Stability: Not on file   , 1 child, working: [employer], no alcohol beverage, none smoker, no recreational drug used.  Tobacco: never smoker    Current Meds:  Reviewed    Physical Exam:  /80 (BP Location: Left arm, Patient Position: Chair, Cuff Size: Adult Regular)   Pulse 66   Resp 16   Wt 79.4 kg (175 lb)   SpO2 98%   BMI 33.07 kg/m    Gen: Alert, oriented, no distress  HEENT: nasal turbinates are unremarkable, no oropharyngeal lesions, no cervical or supraclavicular lymphadenopathy  CV: RRR, no M/G/R  Resp: CTAB, no focal crackles or wheezes  Abd: soft, nontender, no palpable organomegaly  Skin: no apparent rashes  Ext: no cyanosis, clubbing or edema  Neuro: alert, nonfocal    Labs:  Reviewed    1/20 Bronchoscopy/BAL:  Mono/macrocytic predominant  Micro negative  Cytology negative/unremarkable    Imaging studies:  Personally reviewed and interpreted:    3/2/22 CT Chest:  FINDINGS:   LUNGS AND PLEURA: Resolution of previous mid and lower lung predominant subpleural groundglass and consolidative airspace opacities. Scattered subpleural reticulation within the mid and lower lungs at site of some of the previous opacities compatible with residual scarring/fibrosis. No new focal consolidation or pleural effusion. Central airways are patent.     MEDIASTINUM/AXILLAE: No mass/adenopathy nor pericardial effusion. The thoracic aorta and heart are normal in size.     CORONARY ARTERY CALCIFICATION:  None.     UPPER ABDOMEN: Benign focal fat left hepatic lobe unchanged along the interlobar fissure.     MUSCULOSKELETAL: Stable benign sclerotic anterolateral right seventh rib lesion.                                                  IMPRESSION:   1.  Residual scattered mid and lower lung subpleural scarring at site of previous pneumonia.    12/28/21 CT Chest:  LUNGS AND PLEURA: Ground-glass density and consolidative multifocal air-space opacities in a peribronchovascular and peripheral distribution in the mid and lower lungs with about 25% involvement of left mid and lower lung and less than 25% involvement of the right mid and lower lung. No pleural effusion.     MEDIASTINUM/AXILLAE: No lymphadenopathy. Heart size normal with no pericardial effusion.     CORONARY ARTERY CALCIFICATION: None.     UPPER ABDOMEN: No significant finding. Benign focal fatty infiltration medial segment left hepatic lobe requires no follow-up.     MUSCULOSKELETAL: A benign 15 x 12 x 8 mm focus of dense sclerosis anterolateral right 7th rib unchanged since 4/29/2015 CT abdomen accounts for the right lower lung nodule at recent chest radiograph. The normal left 1st costochondral junction accounts for the left upper nodule at recent chest radiograph.     IMPRESSION:  1.  No concerning lung nodules.  2.  Ground-glass density and consolidative focal airspace opacities in a peribronchovascular and peripheral distribution mid and lower lungs have developed. Commonly reported imaging features of (COVID-19) pneumonia are present. Influenza pneumonia and   organizing pneumonia as can be seen in the setting of drug toxicity and connective tissue disease can cause a similar imaging pattern.    PFT's   None

## 2022-03-15 NOTE — PATIENT INSTRUCTIONS
It was a pleasure to see you today.    All of the bronchoscopy tests are negative for infection.  I suspect this was an inflammatory pneumonia, many resolve without any intervention like yours did - the CT scan is now clear.     I will communicate with Dr. Herbert. If there are other alternatives to methotrexate, that would be preferable. If not, it can be tried with close monitoring.     See me as needed.      Fern Álvarez MD  Pulmonary and Critical Care Medicine  [facility]  Office: [phone]

## 2022-03-17 LAB — ACID FAST STAIN (ARUP): NORMAL

## 2022-03-21 ENCOUNTER — TELEPHONE (OUTPATIENT)
Dept: RHEUMATOLOGY | Facility: CLINIC | Age: 47
End: 2022-03-21
Payer: COMMERCIAL

## 2022-03-22 NOTE — TELEPHONE ENCOUNTER
"Please discuss with the patient:    Patient's pulmonologist mentioned the following during recent encounter:    \"Recommendations:  Suspect this was mild inflammatory parenchymal disease, self-resolved. Given above, ie, these changes were present on CXR BEFORE starting the methotrexate, I don't feel this drug can be implicated. That being said, if there are other alternatives that have a lower risk profile for pneumonitis, those would be preferred. If not, this can be tried again, with close symptom monitoring and low threshold to scan.     This may recur even in absence of a drug induced culprit.     Discussed with her in detail today\".       Given that patient already tried Imuran and Plaquenil with our options being limited, is patient interested in restarting low-dose methotrexate at 4 tablets weekly and we can monitor response with time.  If patient were to develop any shortness of breath or cough (not related to a cold), she should then update us immediately to consider holding medication, rescanning and having her follow-up again with pulmonology.    If patient is fine with above then please represcribe methotrexate 4 tablets weekly along with folic acid 1 mg daily except the day when taking methotrexate.  Check CBC with differential, creatinine, AST/ALT albumin approximately 4 to 6 weeks after reinitiating and thereafter if stable every 12 weeks.    Hold methotrexate if comes down with any infection, can restart once infection clears.  If on antibiotics should complete course of antibiotics as well prior to restarting methotrexate.        "

## 2022-03-24 NOTE — TELEPHONE ENCOUNTER
[facility] Herbert's recommendations, will await pt reply if she wants to try methotrexate again.

## 2022-04-29 ENCOUNTER — TRANSFERRED RECORDS (OUTPATIENT)
Dept: HEALTH INFORMATION MANAGEMENT | Facility: CLINIC | Age: 47
End: 2022-04-29
Payer: COMMERCIAL

## 2022-04-29 LAB
CREATININE (EXTERNAL): 0.76 MG/DL (ref 0.57–1)
GFR ESTIMATED (EXTERNAL): 97 ML/MIN/1.73M2
GLUCOSE (EXTERNAL): 83 MG/DL (ref 65–99)
POTASSIUM (EXTERNAL): 4.1 MMOL/L (ref 3.5–5.2)

## 2022-05-09 ENCOUNTER — MYC MEDICAL ADVICE (OUTPATIENT)
Dept: RHEUMATOLOGY | Facility: CLINIC | Age: 47
End: 2022-05-09
Payer: COMMERCIAL

## 2022-05-09 DIAGNOSIS — M19.90 INFLAMMATORY ARTHRITIS: ICD-10-CM

## 2022-05-09 DIAGNOSIS — M35.04 SJOGREN'S SYNDROME WITH TUBULO-INTERSTITIAL NEPHROPATHY (H): ICD-10-CM

## 2022-05-09 NOTE — TELEPHONE ENCOUNTER
Do you still recommend pt resume methotrexate at 4 tablets per week as advised in 3/22/22 message? Pt has not done so yet as she just read this message

## 2022-05-10 NOTE — TELEPHONE ENCOUNTER
Please mention to the patient:    If desires to try methotrexate, she can try methotrexate, please review details of message from March 21, 2022 with the patient, including checking labs and when to hold, etc.    Recommend she schedule a follow-up reassessment.    Patient apparently missed last appointment on May 2, 2022.

## 2022-05-11 RX ORDER — FOLIC ACID 1 MG/1
TABLET ORAL
Qty: 90 TABLET | Refills: 0 | Status: SHIPPED | OUTPATIENT
Start: 2022-05-11 | End: 2022-11-28

## 2022-05-11 NOTE — TELEPHONE ENCOUNTER
"Methotrexate and folic acid rx sent to pharmacy per Dr Herbert notes below    \"methotrexate 4 tablets weekly along with folic acid 1 mg daily except the day when taking methotrexate.  Check CBC with differential, creatinine, AST/ALT albumin approximately 4 to 6 weeks after reinitiating and thereafter if stable every 12 weeks.\"     Replied to pts [facility] message    "

## 2022-06-09 ENCOUNTER — TRANSFERRED RECORDS (OUTPATIENT)
Dept: HEALTH INFORMATION MANAGEMENT | Facility: CLINIC | Age: 47
End: 2022-06-09
Payer: COMMERCIAL

## 2022-07-21 ENCOUNTER — LAB (OUTPATIENT)
Dept: LAB | Facility: CLINIC | Age: 47
End: 2022-07-21
Payer: COMMERCIAL

## 2022-07-21 DIAGNOSIS — N25.89 TYPE II RTA: ICD-10-CM

## 2022-07-21 DIAGNOSIS — M19.90 INFLAMMATORY ARTHRITIS: ICD-10-CM

## 2022-07-21 DIAGNOSIS — M35.04 SJOGREN'S SYNDROME WITH TUBULO-INTERSTITIAL NEPHROPATHY (H): ICD-10-CM

## 2022-07-21 LAB
ALBUMIN SERPL BCG-MCNC: 4.1 G/DL (ref 3.5–5.2)
ALT SERPL W P-5'-P-CCNC: 17 U/L (ref 10–35)
ANION GAP SERPL CALCULATED.3IONS-SCNC: 10 MMOL/L (ref 7–15)
AST SERPL W P-5'-P-CCNC: 19 U/L (ref 10–35)
BASOPHILS # BLD AUTO: 0 10E3/UL (ref 0–0.2)
BASOPHILS NFR BLD AUTO: 0 %
BUN SERPL-MCNC: 20.2 MG/DL (ref 6–20)
CALCIUM SERPL-MCNC: 9.1 MG/DL (ref 8.6–10)
CHLORIDE SERPL-SCNC: 107 MMOL/L (ref 98–107)
CK SERPL-CCNC: 31 U/L (ref 26–192)
CREAT SERPL-MCNC: 0.92 MG/DL (ref 0.51–0.95)
DEPRECATED HCO3 PLAS-SCNC: 21 MMOL/L (ref 22–29)
EOSINOPHIL # BLD AUTO: 0.1 10E3/UL (ref 0–0.7)
EOSINOPHIL NFR BLD AUTO: 1 %
ERYTHROCYTE [DISTWIDTH] IN BLOOD BY AUTOMATED COUNT: 15.8 % (ref 10–15)
GFR SERPL CREATININE-BSD FRML MDRD: 76 ML/MIN/1.73M2
GLUCOSE SERPL-MCNC: 80 MG/DL (ref 70–99)
HCT VFR BLD AUTO: 38.3 % (ref 35–47)
HGB BLD-MCNC: 12.2 G/DL (ref 11.7–15.7)
IMM GRANULOCYTES # BLD: 0 10E3/UL
IMM GRANULOCYTES NFR BLD: 0 %
LYMPHOCYTES # BLD AUTO: 1.1 10E3/UL (ref 0.8–5.3)
LYMPHOCYTES NFR BLD AUTO: 21 %
MCH RBC QN AUTO: 27.7 PG (ref 26.5–33)
MCHC RBC AUTO-ENTMCNC: 31.9 G/DL (ref 31.5–36.5)
MCV RBC AUTO: 87 FL (ref 78–100)
MONOCYTES # BLD AUTO: 0.5 10E3/UL (ref 0–1.3)
MONOCYTES NFR BLD AUTO: 9 %
NEUTROPHILS # BLD AUTO: 3.5 10E3/UL (ref 1.6–8.3)
NEUTROPHILS NFR BLD AUTO: 69 %
PLATELET # BLD AUTO: 205 10E3/UL (ref 150–450)
POTASSIUM SERPL-SCNC: 4.5 MMOL/L (ref 3.4–5.3)
RBC # BLD AUTO: 4.41 10E6/UL (ref 3.8–5.2)
SODIUM SERPL-SCNC: 138 MMOL/L (ref 136–145)
WBC # BLD AUTO: 5.1 10E3/UL (ref 4–11)

## 2022-07-21 PROCEDURE — 84450 TRANSFERASE (AST) (SGOT): CPT

## 2022-07-21 PROCEDURE — 82550 ASSAY OF CK (CPK): CPT

## 2022-07-21 PROCEDURE — 85025 COMPLETE CBC W/AUTO DIFF WBC: CPT

## 2022-07-21 PROCEDURE — 82040 ASSAY OF SERUM ALBUMIN: CPT

## 2022-07-21 PROCEDURE — 84460 ALANINE AMINO (ALT) (SGPT): CPT

## 2022-07-21 PROCEDURE — 36415 COLL VENOUS BLD VENIPUNCTURE: CPT

## 2022-07-21 PROCEDURE — 80048 BASIC METABOLIC PNL TOTAL CA: CPT

## 2022-07-23 ENCOUNTER — HEALTH MAINTENANCE LETTER (OUTPATIENT)
Age: 47
End: 2022-07-23

## 2022-09-12 DIAGNOSIS — M19.90 INFLAMMATORY ARTHRITIS: ICD-10-CM

## 2022-09-12 DIAGNOSIS — M35.04 SJOGREN'S SYNDROME WITH TUBULO-INTERSTITIAL NEPHROPATHY (H): ICD-10-CM

## 2022-09-12 NOTE — TELEPHONE ENCOUNTER
Please call pt to schedule lab appt in October    Lab orders in chart    Refilled per Rheum RN refill protocol

## 2022-10-01 ENCOUNTER — HEALTH MAINTENANCE LETTER (OUTPATIENT)
Age: 47
End: 2022-10-01

## 2022-10-04 DIAGNOSIS — F33.42 RECURRENT MAJOR DEPRESSIVE DISORDER, IN FULL REMISSION (H): ICD-10-CM

## 2022-10-04 NOTE — TELEPHONE ENCOUNTER
Reason for Call:  Medication or medication refill:    Do you use a [facility]?  Name of the pharmacy and phone number for the current request:  [facility] - [place], [place] - [facility] [address] [place]    Name of the medication requested: citalopram (CELEXA) 20 MG tablet       Other request: Patient has been out for a little while but is needing a refill to last until her appointment with [facility] on 10/10    Can we leave a detailed message on this number? YES    Phone number patient can be reached at: Home number on file [phone] (home)    Best Time: any    Call taken on 10/4/2022 at 10:20 AM by Adolph Islas

## 2022-10-05 RX ORDER — CITALOPRAM HYDROBROMIDE 20 MG/1
20 TABLET ORAL EVERY EVENING
Qty: 30 TABLET | Refills: 0 | Status: SHIPPED | OUTPATIENT
Start: 2022-10-05 | End: 2022-10-10

## 2022-10-10 ENCOUNTER — OFFICE VISIT (OUTPATIENT)
Dept: INTERNAL MEDICINE | Facility: CLINIC | Age: 47
End: 2022-10-10
Payer: COMMERCIAL

## 2022-10-10 VITALS
SYSTOLIC BLOOD PRESSURE: 143 MMHG | TEMPERATURE: 98.3 F | OXYGEN SATURATION: 98 % | RESPIRATION RATE: 19 BRPM | HEART RATE: 66 BPM | DIASTOLIC BLOOD PRESSURE: 81 MMHG | BODY MASS INDEX: 34.63 KG/M2 | WEIGHT: 183.3 LBS

## 2022-10-10 DIAGNOSIS — F33.42 RECURRENT MAJOR DEPRESSIVE DISORDER, IN FULL REMISSION (H): ICD-10-CM

## 2022-10-10 PROCEDURE — 99213 OFFICE O/P EST LOW 20 MIN: CPT | Performed by: INTERNAL MEDICINE

## 2022-10-10 PROCEDURE — 96127 BRIEF EMOTIONAL/BEHAV ASSMT: CPT | Performed by: INTERNAL MEDICINE

## 2022-10-10 RX ORDER — CITALOPRAM HYDROBROMIDE 20 MG/1
20 TABLET ORAL DAILY
Qty: 90 TABLET | Refills: 3 | Status: SHIPPED | OUTPATIENT
Start: 2022-10-10 | End: 2023-05-02

## 2022-10-10 ASSESSMENT — ANXIETY QUESTIONNAIRES
IF YOU CHECKED OFF ANY PROBLEMS ON THIS QUESTIONNAIRE, HOW DIFFICULT HAVE THESE PROBLEMS MADE IT FOR YOU TO DO YOUR WORK, TAKE CARE OF THINGS AT HOME, OR GET ALONG WITH OTHER PEOPLE: NOT DIFFICULT AT ALL
7. FEELING AFRAID AS IF SOMETHING AWFUL MIGHT HAPPEN: SEVERAL DAYS
3. WORRYING TOO MUCH ABOUT DIFFERENT THINGS: SEVERAL DAYS
8. IF YOU CHECKED OFF ANY PROBLEMS, HOW DIFFICULT HAVE THESE MADE IT FOR YOU TO DO YOUR WORK, TAKE CARE OF THINGS AT HOME, OR GET ALONG WITH OTHER PEOPLE?: NOT DIFFICULT AT ALL
4. TROUBLE RELAXING: MORE THAN HALF THE DAYS
1. FEELING NERVOUS, ANXIOUS, OR ON EDGE: SEVERAL DAYS
7. FEELING AFRAID AS IF SOMETHING AWFUL MIGHT HAPPEN: SEVERAL DAYS
5. BEING SO RESTLESS THAT IT IS HARD TO SIT STILL: NOT AT ALL
GAD7 TOTAL SCORE: 6
GAD7 TOTAL SCORE: 6
2. NOT BEING ABLE TO STOP OR CONTROL WORRYING: NOT AT ALL
6. BECOMING EASILY ANNOYED OR IRRITABLE: SEVERAL DAYS
GAD7 TOTAL SCORE: 6

## 2022-10-10 ASSESSMENT — PATIENT HEALTH QUESTIONNAIRE - PHQ9
SUM OF ALL RESPONSES TO PHQ QUESTIONS 1-9: 11
SUM OF ALL RESPONSES TO PHQ QUESTIONS 1-9: 11
10. IF YOU CHECKED OFF ANY PROBLEMS, HOW DIFFICULT HAVE THESE PROBLEMS MADE IT FOR YOU TO DO YOUR WORK, TAKE CARE OF THINGS AT HOME, OR GET ALONG WITH OTHER PEOPLE: SOMEWHAT DIFFICULT

## 2022-10-10 NOTE — PROGRESS NOTES
ASSESSMENT AND PLAN:    3. Recurrent major depressive disorder  Refilled.   - citalopram (CELEXA) 20 MG tablet; Take 1 tablet (20 mg) by mouth daily  Dispense: 90 tablet; Refill: 3      Follow up yearly and as needed.     CHIEF COMPLAINT:  Follow up depression    HISTORY OF PRESENT ILLNESS:  Jennyfer Terrazas is a 47 year old female here to have her citalopram refilled.  Doing well overall.  No symptoms of depression, or adverse side effects. Ongoing treatment with rheumatology for sjogren's disease, and nephrology for RTA secondary to sjogren's disease.     REVIEW OF SYSTEMS:   See HPI, all other systems on review are negative.    Past Medical History:   Diagnosis Date     Anemia      Anxiety      Depression      Metabolic acidosis      Non-traumatic rhabdomyolysis      Sjogren's disease (H)      Vitamin B12 deficiency      Vitamin D deficiency      History   Smoking Status     Never   Smokeless Tobacco     Never     Family History   Problem Relation Age of Onset     Arthritis Mother      Cancer Mother         brain     Depression Mother      Hearing Loss Mother      Vision Loss Mother      Leukemia Father      Hearing Loss Father      Diabetes Maternal Grandfather      Hypertension Maternal Grandfather      Arthritis Paternal Grandmother      Heart Disease Paternal Grandmother      Hypertension Paternal Grandmother      Kidney Disease Paternal Grandmother      Mental Illness Paternal Grandmother      Alcoholism Paternal Grandfather      Arthritis Paternal Grandfather      Asthma Paternal Grandfather      Clotting Disorder Paternal Grandfather      Dementia Paternal Grandfather      Depression Paternal Grandfather      Asthma Paternal Aunt      Past Surgical History:   Procedure Laterality Date     BRONCHOSCOPY FLEXIBLE AND RIGID Bilateral 01/20/2022    Procedure: BRONCHOSCOPY;  Surgeon: Shoshana Cisneros MD;  Location: [facility] OR     UTERINE FIBROID SURGERY       VITALS:  Vitals:    10/10/22 1018   BP: (!) 143/81    BP Location: Left arm   Patient Position: Sitting   Cuff Size: Adult Regular   Pulse: 66   Resp: 19   Temp: 98.3  F (36.8  C)   TempSrc: Oral   SpO2: 98%   Weight: 83.1 kg (183 lb 4.8 oz)     Wt Readings from Last 3 Encounters:   10/10/22 83.1 kg (183 lb 4.8 oz)   03/15/22 79.4 kg (175 lb)   01/20/22 80.6 kg (177 lb 9.6 oz)     PHYSICAL EXAM:  Constitutional:  In NAD, alert and oriented    DECISION TO OBTAIN OLD RECORDS AND/OR OBTAIN HISTORY FROM SOMEONE OTHER THAN PATIENT, AND/OR ACCESSING CARE EVERYWHERE):  1 reviewed nephrology evaluation     REVIEW AND SUMMARIZATION OF OLD RECORDS, AND/OR OBTAINING HISTORY FROM SOMEONE OTHER THAN PATIENT, AND/OR DISCUSSION OF CASE WITH ANOTHER HEALTH CARE PROVIDER:  2 reviewed rheumatology notes.     REVIEW AND/OR ORDER OF OF CLINICAL LAB TESTS: 1  Reviewed most recent results.    REVIEW AND/OR ORDER OF RADIOLOGY TESTS: 1 0.    REVIEW AND/OR ORDER OF MEDICAL TESTS (EKG/ECHO/COLONOSCOPY/EGD): 1  0    INDEPENDENT  VISUALIZATION OF IMAGE, TRACING, OR SPECIMEN ITSELF (2 EACH):  0     TOTAL: 4    Current Outpatient Medications   Medication Sig Dispense Refill     caffeine (FOR VIVARIN) 200 mg Tab [CAFFEINE (FOR VIVARIN) 200 MG TAB] Take 200 mg by mouth.       citalopram (CELEXA) 20 MG tablet Take 1 tablet (20 mg) by mouth daily 90 tablet 3     folic acid (FOLVITE) 1 MG tablet Take one tab daily, except the day when taking methotrexate. 90 tablet 0     methotrexate 2.5 MG tablet TAKE 4 TABLETS BY MOUTH ONCE A WEEK. HOLD DOSE IF YOU COME DOWN WITH ANY INFECTION, CAN RESTART ONCE INFECTION CLEARS. 16 tablet 0     mv-mn/iron fum/FA/omega3,6,9#3 (WOMEN'S MULTI ORAL) [MV-MN/IRON FUM/FA/OMEGA3,6,9#3 (WOMEN'S MULTI ORAL)] Take 1 tablet by mouth daily.       potassium chloride (KLOR-CON) 20 mEq packet [POTASSIUM CHLORIDE (KLOR-CON) 20 MEQ PACKET] Take 20 mEq by mouth 2 (two) times a day. 180 packet 3     sodium bicarbonate 650 MG tablet [SODIUM BICARBONATE 650 MG TABLET] TAKE 2 TABLETS  (1,300 MG) BY MOUTH 2 (TWO) TIMES A DAY. 120 tablet 1     Kris Philip MD  Internal Medicine  [facility]

## 2022-10-21 ENCOUNTER — LAB (OUTPATIENT)
Dept: LAB | Facility: CLINIC | Age: 47
End: 2022-10-21
Payer: COMMERCIAL

## 2022-10-21 DIAGNOSIS — M19.90 INFLAMMATORY ARTHRITIS: ICD-10-CM

## 2022-10-21 DIAGNOSIS — E87.6 HYPOKALEMIA: Primary | ICD-10-CM

## 2022-10-21 LAB
ALBUMIN SERPL BCG-MCNC: 4.2 G/DL (ref 3.5–5.2)
ALT SERPL W P-5'-P-CCNC: 22 U/L (ref 10–35)
CREAT SERPL-MCNC: 0.82 MG/DL (ref 0.51–0.95)
ERYTHROCYTE [DISTWIDTH] IN BLOOD BY AUTOMATED COUNT: 16.3 % (ref 10–15)
GFR SERPL CREATININE-BSD FRML MDRD: 87 ML/MIN/1.73M2
HCT VFR BLD AUTO: 42.7 % (ref 35–47)
HGB BLD-MCNC: 13.4 G/DL (ref 11.7–15.7)
MCH RBC QN AUTO: 28.6 PG (ref 26.5–33)
MCHC RBC AUTO-ENTMCNC: 31.4 G/DL (ref 31.5–36.5)
MCV RBC AUTO: 91 FL (ref 78–100)
PLATELET # BLD AUTO: 211 10E3/UL (ref 150–450)
RBC # BLD AUTO: 4.68 10E6/UL (ref 3.8–5.2)
WBC # BLD AUTO: 9.4 10E3/UL (ref 4–11)

## 2022-10-21 PROCEDURE — 85027 COMPLETE CBC AUTOMATED: CPT

## 2022-10-21 PROCEDURE — 82565 ASSAY OF CREATININE: CPT

## 2022-10-21 PROCEDURE — 36415 COLL VENOUS BLD VENIPUNCTURE: CPT

## 2022-10-21 PROCEDURE — 84460 ALANINE AMINO (ALT) (SGPT): CPT

## 2022-10-21 PROCEDURE — 82040 ASSAY OF SERUM ALBUMIN: CPT

## 2022-10-21 RX ORDER — POTASSIUM CHLORIDE 1.5 G/1.58G
20 POWDER, FOR SOLUTION ORAL 2 TIMES DAILY
Qty: 180 PACKET | Refills: 0 | Status: SHIPPED | OUTPATIENT
Start: 2022-10-21

## 2022-10-21 NOTE — TELEPHONE ENCOUNTER
[facility]          potassium chloride (KLOR-CON) 20 mEq packet [Jared Rainey]     Preferred pharmacy: [facility] - [place], [place] - [address] [place] [address]

## 2022-11-17 ENCOUNTER — TELEPHONE (OUTPATIENT)
Dept: RHEUMATOLOGY | Facility: CLINIC | Age: 47
End: 2022-11-17

## 2022-11-17 NOTE — TELEPHONE ENCOUNTER
[facility] Call Center    Phone Message    May a detailed message be left on voicemail: yes     Reason for Call: Other: Pt wants to talk to someone about methotrexate. Pt states she has been taking it for 6 months and feels like her symptoms have gotten worse. Pt states her symptoms are fatigue, nausea and dizziness. Pt would like a call back at [phone].     Action Taken: Message routed to:  Other: Rheumatology Support Pool     Travel Screening: Not Applicable

## 2022-11-17 NOTE — TELEPHONE ENCOUNTER
Per Dr Zee- he will need to see the pt and further evaluate pt as he has not seen pt previously before making med changes, but pt can try increasing folic acid to 3mg daily now to see if this help these side effects she is describing.     Left detailed message for pt with above information and to keep appt as scheduled on 11/28 with Dr Zee

## 2022-11-28 ENCOUNTER — OFFICE VISIT (OUTPATIENT)
Dept: RHEUMATOLOGY | Facility: CLINIC | Age: 47
End: 2022-11-28
Payer: COMMERCIAL

## 2022-11-28 ENCOUNTER — LAB (OUTPATIENT)
Dept: LAB | Facility: CLINIC | Age: 47
End: 2022-11-28
Payer: COMMERCIAL

## 2022-11-28 VITALS
BODY MASS INDEX: 33.91 KG/M2 | DIASTOLIC BLOOD PRESSURE: 66 MMHG | HEART RATE: 68 BPM | SYSTOLIC BLOOD PRESSURE: 130 MMHG | WEIGHT: 179.6 LBS | HEIGHT: 61 IN

## 2022-11-28 DIAGNOSIS — M35.04 SJOGREN'S SYNDROME WITH TUBULO-INTERSTITIAL NEPHROPATHY (H): Primary | ICD-10-CM

## 2022-11-28 DIAGNOSIS — M15.0 PRIMARY OSTEOARTHRITIS INVOLVING MULTIPLE JOINTS: ICD-10-CM

## 2022-11-28 DIAGNOSIS — R76.8 SS-A ANTIBODY POSITIVE: ICD-10-CM

## 2022-11-28 DIAGNOSIS — M35.04 SJOGREN'S SYNDROME WITH TUBULO-INTERSTITIAL NEPHROPATHY (H): ICD-10-CM

## 2022-11-28 LAB
C3 SERPL-MCNC: 135 MG/DL (ref 81–157)
C4 SERPL-MCNC: 26 MG/DL (ref 13–39)
DSDNA AB SER-ACNC: 1.6 IU/ML

## 2022-11-28 PROCEDURE — 36415 COLL VENOUS BLD VENIPUNCTURE: CPT

## 2022-11-28 PROCEDURE — 99214 OFFICE O/P EST MOD 30 MIN: CPT | Performed by: INTERNAL MEDICINE

## 2022-11-28 PROCEDURE — 86160 COMPLEMENT ANTIGEN: CPT

## 2022-11-28 PROCEDURE — 86160 COMPLEMENT ANTIGEN: CPT | Mod: 59

## 2022-11-28 PROCEDURE — 86225 DNA ANTIBODY NATIVE: CPT

## 2022-11-28 RX ORDER — LISINOPRIL 5 MG/1
5 TABLET ORAL DAILY
COMMUNITY

## 2022-11-28 NOTE — PROGRESS NOTES
This document was created using a software with less than 100% fidelity, at times resulting in unintended, even erroneous syntax and grammar.  The reader is advised to keep this under consideration while reviewing, interpreting this note.      Rheumatology Consult Note      Jennyfer Terrazas     YOB: 1975 Age: 47 year old    Date of visit: 11/28/22    PCP: Jared Rainey    Chief Complaint   Patient presents with:  RECHECK: NICK- discuss medication       Assessment and Plan     Jennyfer was seen today for recheck.    Diagnoses and all orders for this visit:    Sjogren's syndrome with tubulo-interstitial nephropathy (H)  -     Complement C4; Future  -     Complement C3; Future  -     DNA double stranded antibodies; Future    SS-A antibody positive    Primary osteoarthritis involving multiple joints           This patient with Sjogren's syndrome manifested by dry symptoms, positive ERVIN SSA SSB antibodies, type II renal tubular acidosis, polyarthralgias which are more likely due to osteoarthritis despite her relatively young age.  She has not been able to tolerate methotrexate.  We discussed options.  She is going to stop taking methotrexate.  We will meet here in 4 months.  Meanwhile check labs as noted.  She has taken acetaminophen on a as needed basis she is going to take it on a regular basis.  We will meet here in the next 3 to 4 months or sooner.  Regarding her dry symptoms she has not tried secretagogues in the past.  For now she would defer taking secretagogues.   Return to clinic: 4 months      HPI   Jennyfer Terrazas is a 47 year old female  is seen today for evaluation of an establishment of care for Sjogren's syndrome.  This came to light after she was seen in the emergency room where she was found to have type II renal tubular acidosis.  Further serologies revealed positive ERVIN SSA SSB.  At this point she also noted dry symptoms such as dry mouth and dry eyes.  As she has cement and go to dental  health she sees a dentist every 6 months.  She was seen in rheumatology.  She had mentioned joint pains.  There is a question of if she may or may not have inflammatory component to her joint symptoms.  She had evidence of osteoarthritis clinically and radiologically.  Initially started on azathioprine and then changed to methotrexate.  If she feels that having been on it for several months she cannot say that it has done any good for her except making her nauseated.  She has not taken secretagogues.  She follows up at nephrology and is getting potassium and bicarb..  She has noted painful joint such as in her hands and this did typically worsen with activity usually the day after she is active more than usual she will experience joint pains.  She does not think there is any swelling of the joints.  She has had a normal pregnancies.  There is no photosensitivity.  She does not have stomatitis.  She gets fleeting chest discomfort and sternoclavicular area.  This lasts few seconds.  She had a positive anticardiolipin antibody that was rechecked after 4 weeks and was still positive when checked again at 12 weeks later on at this had become negative.  Her lupus anticoagulant was negative.  She has no history that she is aware of thromboembolic phenomenon, she has not had pregnancy related morbidities.         Active Problem List     Patient Active Problem List   Diagnosis     Major Depression, Recurrent     Anemia     Generalized Anxiety Disorder     Lower resp. tract infection     Vitamin B12 deficiency (non anemic)     Vitamin D deficiency     Anti-TPO antibodies present     Hypokalemia     Non-traumatic rhabdomyolysis     Metabolic acidosis     Generalized muscle weakness     Past Medical History     Past Medical History:   Diagnosis Date     Anemia      Anxiety      Depression      Metabolic acidosis      Non-traumatic rhabdomyolysis      Sjogren's disease (H)      Vitamin B12 deficiency      Vitamin D deficiency       Past Surgical History     Past Surgical History:   Procedure Laterality Date     BRONCHOSCOPY FLEXIBLE AND RIGID Bilateral 01/20/2022    Procedure: BRONCHOSCOPY;  Surgeon: Shoshana Cisneros MD;  Location: [facility] OR     UTERINE FIBROID SURGERY       Allergy     Allergies   Allergen Reactions     Codeine Other (See Comments)     Tylenol 3, stomach pains that resulted in ER visit     Grass Unknown     Plaquenil [Hydroxychloroquine]      Dizziness and fatigue      Current Medication List     Current Outpatient Medications   Medication Sig     caffeine (FOR VIVARIN) 200 mg Tab [CAFFEINE (FOR VIVARIN) 200 MG TAB] Take 200 mg by mouth.     citalopram (CELEXA) 20 MG tablet Take 1 tablet (20 mg) by mouth daily     folic acid (FOLVITE) 1 MG tablet Take one tab daily, except the day when taking methotrexate.     lisinopril (ZESTRIL) 5 MG tablet Take 5 mg by mouth daily     methotrexate 2.5 MG tablet TAKE 4 TABLETS BY MOUTH ONCE A WEEK. HOLD DOSE IF YOU COME DOWN WITH ANY INFECTION, CAN RESTART ONCE INFECTION CLEARS.     mv-mn/iron fum/FA/omega3,6,9#3 (WOMEN'S MULTI ORAL) [MV-MN/IRON FUM/FA/OMEGA3,6,9#3 (WOMEN'S MULTI ORAL)] Take 1 tablet by mouth daily.     potassium chloride (KLOR-CON) 20 MEQ packet Take 20 mEq by mouth 2 times daily     potassium chloride (KLOR-CON) 20 mEq packet [POTASSIUM CHLORIDE (KLOR-CON) 20 MEQ PACKET] Take 20 mEq by mouth 2 (two) times a day.     sodium bicarbonate 650 MG tablet [SODIUM BICARBONATE 650 MG TABLET] TAKE 2 TABLETS (1,300 MG) BY MOUTH 2 (TWO) TIMES A DAY.     No current facility-administered medications for this visit.            Family History     Family History   Problem Relation Age of Onset     Arthritis Mother      Cancer Mother         brain     Depression Mother      Hearing Loss Mother      Vision Loss Mother      Leukemia Father      Hearing Loss Father      Diabetes Maternal Grandfather      Hypertension Maternal Grandfather      Arthritis Paternal Grandmother      Heart  "Disease Paternal Grandmother      Hypertension Paternal Grandmother      Kidney Disease Paternal Grandmother      Mental Illness Paternal Grandmother      Alcoholism Paternal Grandfather      Arthritis Paternal Grandfather      Asthma Paternal Grandfather      Clotting Disorder Paternal Grandfather      Dementia Paternal Grandfather      Depression Paternal Grandfather      Asthma Paternal Aunt          Physical Exam     COMPREHENSIVE EXAMINATION:  Vitals:    11/28/22 0852   BP: 130/66   BP Location: Right arm   Patient Position: Sitting   Cuff Size: Adult Regular   Pulse: 68   Weight: 81.5 kg (179 lb 9.6 oz)   Height: 1.549 m (5' 1\")     A well appearing alert oriented female. Vital data as noted above. Her eyes examined for inflammation/scleromalacia. ENT examined for oral mucositis, moisture, thrush, nasal deformity, external ear redness, deformity. Her neck is examined for suppleness and lymphadenopathy.  Cardiopulmonary examination without dyspnea at rest, use of accessory muscles of breathing, wheezing, edema, peripheral or central cyanosis.  Abdomen examined for softness, tenderness, obvious organomegaly.  Skin examined for heliotrope, malar area eruption, lupus pernio, periungual erythema, sclerodactyly, papules, erythema nodosum, purpura, nail pitting, onycholysis, and obvious psoriasis lesion. Neurological examination done for alertness, speech, facial symmetry,  tone and power in upper and lower extremities, and gait. The joint examination is performed for swelling, tenderness, warmth, erythema, and range of motion in the following joints: DIPs, PIPs, MCPs, wrists, first CMC's, elbows, shoulders, hips, knees, ankles, feet; spine for range of motion and paraspinal muscles for tenderness. The salient normal / abnormal findings are appended.  She does not have a rash on the face no stomatitis she has still has full of saliva underneath the tongue.  Little to suggest thrush.  She has no synovitis of palpable " joints, she has [place], Shani such as the right middle finger some of these are tender.  There is no sclerodactyly periungual erythema.         Labs / Imaging (select studies)     C3 Complement   Date Value Ref Range Status   12/15/2020 127 83 - 177 mg/dL Final     C4 Complement   Date Value Ref Range Status   12/15/2020 21 19 - 59 mg/dL Final      Hemoglobin   Date Value Ref Range Status   10/21/2022 13.4 11.7 - 15.7 g/dL Final   07/21/2022 12.2 11.7 - 15.7 g/dL Final   03/01/2022 12.9 11.7 - 15.7 g/dL Final     Urea Nitrogen   Date Value Ref Range Status   07/21/2022 20.2 (H) 6.0 - 20.0 mg/dL Final   01/18/2022 14 8 - 22 mg/dL Final   04/23/2021 17 8 - 22 mg/dL Final   04/01/2021 19 8 - 22 mg/dL Final     Erythrocyte Sedimentation Rate   Date Value Ref Range Status   12/15/2020 36 (H) 0 - 20 mm/hr Final     CRP   Date Value Ref Range Status   12/15/2020 0.2 0.0 - 0.8 mg/dL Final   10/12/2020 1.3 (H) 0.0 - 0.8 mg/dL Final     AST   Date Value Ref Range Status   07/21/2022 19 10 - 35 U/L Final   01/18/2022 15 0 - 40 U/L Final   06/23/2021 18 0 - 40 U/L Final     Albumin   Date Value Ref Range Status   10/21/2022 4.2 3.5 - 5.2 g/dL Final   07/21/2022 4.1 3.5 - 5.2 g/dL Final   01/18/2022 3.8 3.5 - 5.0 g/dL Final   06/23/2021 3.7 3.5 - 5.0 g/dL Final   04/23/2021 3.9 3.5 - 5.0 g/dL Final     Alkaline Phosphatase   Date Value Ref Range Status   04/23/2021 114 45 - 120 U/L Final   04/01/2021 148 (H) 45 - 120 U/L Final   04/01/2021 148 (H) 45 - 120 U/L Final     ALT   Date Value Ref Range Status   10/21/2022 22 10 - 35 U/L Final   07/21/2022 17 10 - 35 U/L Final   01/18/2022 17 0 - 45 U/L Final          Immunization History     Immunization History   Administered Date(s) Administered     Influenza Vaccine >6 months (Alfuria,Fluzone) 11/13/2017, 02/14/2020, 10/13/2020     Tdap (Adacel,Boostrix) 05/07/2010, 02/14/2020

## 2022-12-06 ENCOUNTER — MYC MEDICAL ADVICE (OUTPATIENT)
Dept: RHEUMATOLOGY | Facility: CLINIC | Age: 47
End: 2022-12-06

## 2022-12-06 DIAGNOSIS — M15.0 PRIMARY OSTEOARTHRITIS INVOLVING MULTIPLE JOINTS: Primary | ICD-10-CM

## 2022-12-06 NOTE — TELEPHONE ENCOUNTER
Per Dr Zee- discussed starting pt on duloxetine 20mg daily and stopping methotrexate due to pt not being able to tolerate methotrexate. [facility] message sent to pt. Pt is also on Celexa, Dr Zee recommends pt reach out to PCP to see if switching to Duloxetine would be an option and if they would prescribe it.

## 2022-12-12 DIAGNOSIS — F32.4 MAJOR DEPRESSIVE DISORDER IN PARTIAL REMISSION, UNSPECIFIED WHETHER RECURRENT (H): Primary | ICD-10-CM

## 2022-12-12 RX ORDER — DULOXETIN HYDROCHLORIDE 20 MG/1
20 CAPSULE, DELAYED RELEASE ORAL 2 TIMES DAILY
Qty: 180 CAPSULE | Refills: 3 | Status: SHIPPED | OUTPATIENT
Start: 2022-12-12 | End: 2024-01-31

## 2023-05-02 ENCOUNTER — OFFICE VISIT (OUTPATIENT)
Dept: INTERNAL MEDICINE | Facility: CLINIC | Age: 48
End: 2023-05-02
Payer: COMMERCIAL

## 2023-05-02 ENCOUNTER — HOSPITAL ENCOUNTER (OUTPATIENT)
Dept: CT IMAGING | Facility: HOSPITAL | Age: 48
Discharge: HOME | End: 2023-05-02
Attending: INTERNAL MEDICINE | Admitting: INTERNAL MEDICINE
Payer: COMMERCIAL

## 2023-05-02 VITALS
SYSTOLIC BLOOD PRESSURE: 135 MMHG | WEIGHT: 183 LBS | RESPIRATION RATE: 18 BRPM | BODY MASS INDEX: 33.68 KG/M2 | HEART RATE: 75 BPM | OXYGEN SATURATION: 98 % | DIASTOLIC BLOOD PRESSURE: 91 MMHG | TEMPERATURE: 98.1 F | HEIGHT: 62 IN

## 2023-05-02 DIAGNOSIS — H92.01 PAIN BEHIND THE EAR, RIGHT: Primary | ICD-10-CM

## 2023-05-02 DIAGNOSIS — M35.04 SJOGREN'S SYNDROME WITH TUBULO-INTERSTITIAL NEPHROPATHY (H): ICD-10-CM

## 2023-05-02 DIAGNOSIS — F33.9 EPISODE OF RECURRENT MAJOR DEPRESSIVE DISORDER, UNSPECIFIED DEPRESSION EPISODE SEVERITY (H): ICD-10-CM

## 2023-05-02 DIAGNOSIS — H92.01 PAIN BEHIND THE EAR, RIGHT: ICD-10-CM

## 2023-05-02 PROCEDURE — 99214 OFFICE O/P EST MOD 30 MIN: CPT | Performed by: INTERNAL MEDICINE

## 2023-05-02 PROCEDURE — 250N000011 HC RX IP 250 OP 636: Performed by: INTERNAL MEDICINE

## 2023-05-02 PROCEDURE — 70481 CT ORBIT/EAR/FOSSA W/DYE: CPT

## 2023-05-02 RX ORDER — DULOXETIN HYDROCHLORIDE 20 MG/1
20 CAPSULE, DELAYED RELEASE ORAL 2 TIMES DAILY
Qty: 180 CAPSULE | Refills: 3 | Status: CANCELLED | OUTPATIENT
Start: 2023-05-02

## 2023-05-02 RX ORDER — IOPAMIDOL 755 MG/ML
67 INJECTION, SOLUTION INTRAVASCULAR ONCE
Status: COMPLETED | OUTPATIENT
Start: 2023-05-02 | End: 2023-05-02

## 2023-05-02 RX ADMIN — IOPAMIDOL 67 ML: 755 INJECTION, SOLUTION INTRAVENOUS at 16:07

## 2023-05-02 ASSESSMENT — PATIENT HEALTH QUESTIONNAIRE - PHQ9
SUM OF ALL RESPONSES TO PHQ QUESTIONS 1-9: 7
SUM OF ALL RESPONSES TO PHQ QUESTIONS 1-9: 7
10. IF YOU CHECKED OFF ANY PROBLEMS, HOW DIFFICULT HAVE THESE PROBLEMS MADE IT FOR YOU TO DO YOUR WORK, TAKE CARE OF THINGS AT HOME, OR GET ALONG WITH OTHER PEOPLE: SOMEWHAT DIFFICULT

## 2023-05-02 NOTE — PROGRESS NOTES
1. Pain behind the ear, right  I am concerned about an acute mastoiditis.  We will do a CT scan for further evaluation to evaluate.  If evidence of mastoiditis would recommend hospitalization with IV antibiotics and urgent ENT consultation.  - CT Sinus with & without Contrast; Future    2. Episode of recurrent major depressive disorder, unspecified depression episode severity (H)  She is overdue for follow-up with Dr. Philip regarding her depression.  She was recently switched to duloxetine    3. Sjogren's syndrome with tubulo-interstitial nephropathy (H)  She follows closely with rheumatology and nephrology I believe.    Brennen Burger is a 47 year old, presenting for the following health issues:  Office Visit (Earache right ear-about 2 weeks. Pain scale has been at a 10-tylenol brings pain down some. Currently pain scale at 6.) and Recheck Medication (Duloxetine refill needed.)        5/2/2023     2:02 PM   Additional Questions   Roomed by marlene granados   Accompanied by self         5/2/2023     2:02 PM   Patient Reported Additional Medications   Patient reports taking the following new medications no     History of Present Illness       Reason for visit:  Ear infection  Symptom onset:  1-2 weeks ago  Symptoms include:  Ear pain herts to touch  Symptom intensity:  Moderate  Symptom progression:  Worsening  What makes it better:  Heat for a while or tylonal    She eats 0-1 servings of fruits and vegetables daily.She consumes 2 sweetened beverage(s) daily.She exercises with enough effort to increase her heart rate 9 or less minutes per day.  She exercises with enough effort to increase her heart rate 3 or less days per week. She is missing 1 dose(s) of medications per week.    Today's PHQ-9         PHQ-9 Total Score: 7    PHQ-9 Q9 Thoughts of better off dead/self-harm past 2 weeks :   Not at all    How difficult have these problems made it for you to do your work, take care of things at home, or get along with other  "people: Somewhat difficult         This is a patient with Sjogren's and chronic depression who follows with Dr. Philip.  She has not seen him in a while.  She comes in with 2 weeks of right ear pain.  Exquisitely tender.  Tender to palpatioion.  Also diminished hearing on that side.  No fevers.  No neurologic symptoms.      Review of Systems         Objective    BP (!) 135/91   Pulse 75   Temp 98.1  F (36.7  C) (Oral)   Resp 18   Ht 1.575 m (5' 2\")   Wt 83 kg (183 lb)   SpO2 98%   BMI 33.47 kg/m    Body mass index is 33.47 kg/m .  Physical Exam       Dry mouth.  Pinna is tender on the right.  Pinna appears normal.  Over the mastoid there is some erythema to the skin.  It looks more rash than cellulitis.  Auditory canal is partially occluded with wax.  No evidence of otitis externa.  She is very tender over the mastoid though.  No erythema of the tympanic membrane.                "

## 2023-08-06 ENCOUNTER — HEALTH MAINTENANCE LETTER (OUTPATIENT)
Age: 48
End: 2023-08-06

## 2024-01-27 ENCOUNTER — MYC REFILL (OUTPATIENT)
Dept: INTERNAL MEDICINE | Facility: CLINIC | Age: 49
End: 2024-01-27
Payer: COMMERCIAL

## 2024-01-27 DIAGNOSIS — F32.4 MAJOR DEPRESSIVE DISORDER IN PARTIAL REMISSION, UNSPECIFIED WHETHER RECURRENT (H): ICD-10-CM

## 2024-01-30 RX ORDER — DULOXETIN HYDROCHLORIDE 20 MG/1
20 CAPSULE, DELAYED RELEASE ORAL 2 TIMES DAILY
Qty: 180 CAPSULE | Refills: 3 | OUTPATIENT
Start: 2024-01-30

## 2024-01-30 NOTE — TELEPHONE ENCOUNTER
Patient was supposed to follow-up with Dr. Philip last spring and still has not had an appointment.

## 2024-01-31 RX ORDER — DULOXETIN HYDROCHLORIDE 20 MG/1
20 CAPSULE, DELAYED RELEASE ORAL 2 TIMES DAILY
Qty: 60 CAPSULE | Refills: 0 | Status: SHIPPED | OUTPATIENT
Start: 2024-01-31 | End: 2024-03-14

## 2024-01-31 NOTE — TELEPHONE ENCOUNTER
Pt made an appointment for Physical with Dr. Philip.on Feb.26 Are you able to refill her medications

## 2024-03-14 ENCOUNTER — ORDERS ONLY (AUTO-RELEASED) (OUTPATIENT)
Dept: INTERNAL MEDICINE | Facility: CLINIC | Age: 49
End: 2024-03-14

## 2024-03-14 ENCOUNTER — OFFICE VISIT (OUTPATIENT)
Dept: INTERNAL MEDICINE | Facility: CLINIC | Age: 49
End: 2024-03-14
Payer: COMMERCIAL

## 2024-03-14 VITALS
WEIGHT: 189.6 LBS | HEART RATE: 69 BPM | RESPIRATION RATE: 16 BRPM | SYSTOLIC BLOOD PRESSURE: 121 MMHG | OXYGEN SATURATION: 100 % | HEIGHT: 62 IN | BODY MASS INDEX: 34.89 KG/M2 | TEMPERATURE: 97.9 F | DIASTOLIC BLOOD PRESSURE: 80 MMHG

## 2024-03-14 DIAGNOSIS — F32.4 MAJOR DEPRESSIVE DISORDER IN PARTIAL REMISSION, UNSPECIFIED WHETHER RECURRENT (H): ICD-10-CM

## 2024-03-14 DIAGNOSIS — Z12.11 SCREEN FOR COLON CANCER: Primary | ICD-10-CM

## 2024-03-14 DIAGNOSIS — Z12.4 CERVICAL CANCER SCREENING: ICD-10-CM

## 2024-03-14 DIAGNOSIS — Z12.11 SCREEN FOR COLON CANCER: ICD-10-CM

## 2024-03-14 DIAGNOSIS — Z12.31 VISIT FOR SCREENING MAMMOGRAM: ICD-10-CM

## 2024-03-14 PROBLEM — E87.6 HYPOKALEMIA: Status: RESOLVED | Noted: 2020-10-12 | Resolved: 2024-03-14

## 2024-03-14 PROCEDURE — 99213 OFFICE O/P EST LOW 20 MIN: CPT | Performed by: INTERNAL MEDICINE

## 2024-03-14 RX ORDER — DULOXETIN HYDROCHLORIDE 20 MG/1
20 CAPSULE, DELAYED RELEASE ORAL 2 TIMES DAILY
Qty: 180 CAPSULE | Refills: 3 | Status: SHIPPED | OUTPATIENT
Start: 2024-03-14

## 2024-03-14 ASSESSMENT — PATIENT HEALTH QUESTIONNAIRE - PHQ9
SUM OF ALL RESPONSES TO PHQ QUESTIONS 1-9: 8
10. IF YOU CHECKED OFF ANY PROBLEMS, HOW DIFFICULT HAVE THESE PROBLEMS MADE IT FOR YOU TO DO YOUR WORK, TAKE CARE OF THINGS AT HOME, OR GET ALONG WITH OTHER PEOPLE: SOMEWHAT DIFFICULT
SUM OF ALL RESPONSES TO PHQ QUESTIONS 1-9: 8

## 2024-03-14 ASSESSMENT — PAIN SCALES - GENERAL: PAINLEVEL: NO PAIN (0)

## 2024-03-14 NOTE — PROGRESS NOTES
ASSESSMENT AND PLAN:    1. Major depressive disorder in partial remission  Medication refilled.    - DULoxetine (CYMBALTA) 20 MG capsule; Take 1 capsule (20 mg) by mouth 2 times daily  Dispense: 180 capsule; Refill: 3    2. Screen for colon cancer  She is agreeable to cologuard,  no high risk issues    3. Visit for screening mammogram  Declines for now    Follow up as planned with primary MD    CHIEF COMPLAINT:  Requests med refill    HISTORY OF PRESENT ILLNESS:  Jennyfer Terrazas is a 48 year old female with request for refill of duloxetine.  She is satisfied with this dose and frequency.  She denies other issues at this time.      Past Medical History:   Diagnosis Date    Anemia     Anxiety     Depression     Metabolic acidosis     Non-traumatic rhabdomyolysis     Sjogren's disease (H24)     Vitamin B12 deficiency     Vitamin D deficiency      History   Smoking Status    Never   Smokeless Tobacco    Never     Family History   Problem Relation Age of Onset    Arthritis Mother     Cancer Mother         brain    Depression Mother     Hearing Loss Mother     Vision Loss Mother     Leukemia Father     Hearing Loss Father     Diabetes Maternal Grandfather     Hypertension Maternal Grandfather     Arthritis Paternal Grandmother     Heart Disease Paternal Grandmother     Hypertension Paternal Grandmother     Kidney Disease Paternal Grandmother     Mental Illness Paternal Grandmother     Alcoholism Paternal Grandfather     Arthritis Paternal Grandfather     Asthma Paternal Grandfather     Clotting Disorder Paternal Grandfather     Dementia Paternal Grandfather     Depression Paternal Grandfather     Asthma Paternal Aunt      Past Surgical History:   Procedure Laterality Date    BRONCHOSCOPY FLEXIBLE AND RIGID Bilateral 01/20/2022    Procedure: BRONCHOSCOPY;  Surgeon: Shoshana Cisneros MD;  Location: [facility] OR    UTERINE FIBROID SURGERY       Allergies   Allergen Reactions    Codeine Other (See Comments)     Tylenol 3,  "stomach pains that resulted in ER visit    Grass Unknown    Plaquenil [Hydroxychloroquine]      Dizziness and fatigue      VITALS:  Vitals:    03/14/24 1351   BP: 121/80   BP Location: Left arm   Patient Position: Sitting   Cuff Size: Adult Large   Pulse: 69   Resp: 16   Temp: 97.9  F (36.6  C)   SpO2: 100%   Weight: 86 kg (189 lb 9.6 oz)   Height: 1.575 m (5' 2\")     Estimated body mass index is 34.68 kg/m  as calculated from the following:    Height as of this encounter: 1.575 m (5' 2\").    Weight as of this encounter: 86 kg (189 lb 9.6 oz).  Wt Readings from Last 3 Encounters:   03/14/24 86 kg (189 lb 9.6 oz)   05/02/23 83 kg (183 lb)   11/28/22 81.5 kg (179 lb 9.6 oz)     PHYSICAL EXAM:  Constitutional:  In NAD, alert and oriented  Psychiatric:  Mood and behavior are appropriate, thinking is clear.     DECISION TO OBTAIN OLD RECORDS AND/OR OBTAIN HISTORY FROM SOMEONE OTHER THAN PATIENT, AND/OR ACCESSING CARE EVERYWHERE):  1  0     REVIEW AND SUMMARIZATION OF OLD RECORDS, AND/OR OBTAINING HISTORY FROM SOMEONE OTHER THAN PATIENT, AND/OR DISCUSSION OF CASE WITH ANOTHER HEALTH CARE PROVIDER:  2 reviewed past health notes    REVIEW AND/OR ORDER OF OF CLINICAL LAB TESTS: 1  0.    REVIEW AND/OR ORDER OF RADIOLOGY TESTS: 1 0.    REVIEW AND/OR ORDER OF MEDICAL TESTS (EKG/ECHO/COLONOSCOPY/EGD): 1  0    INDEPENDENT  VISUALIZATION OF IMAGE, TRACING, OR SPECIMEN ITSELF (2 EACH):  0     TOTAL: 2    Current Outpatient Medications   Medication Sig Dispense Refill    caffeine (FOR VIVARIN) 200 mg Tab [CAFFEINE (FOR VIVARIN) 200 MG TAB] Take 200 mg by mouth.      DULoxetine (CYMBALTA) 20 MG capsule Take 1 capsule (20 mg) by mouth 2 times daily 180 capsule 3    lisinopril (ZESTRIL) 5 MG tablet Take 5 mg by mouth daily      mv-mn/iron fum/FA/omega3,6,9#3 (WOMEN'S MULTI ORAL) [MV-MN/IRON FUM/FA/OMEGA3,6,9#3 (WOMEN'S MULTI ORAL)] Take 1 tablet by mouth daily.      potassium chloride (KLOR-CON) 20 MEQ packet Take 20 mEq by mouth 2 " times daily 180 packet 0    sodium bicarbonate 650 MG tablet [SODIUM BICARBONATE 650 MG TABLET] TAKE 2 TABLETS (1,300 MG) BY MOUTH 2 (TWO) TIMES A DAY. 120 tablet 1     Kris Philip MD  Internal Medicine  [facility]

## 2024-04-12 LAB — NONINV COLON CA DNA+OCC BLD SCRN STL QL: NEGATIVE

## 2024-09-29 ENCOUNTER — HEALTH MAINTENANCE LETTER (OUTPATIENT)
Age: 49
End: 2024-09-29

## 2025-01-16 ENCOUNTER — OFFICE VISIT (OUTPATIENT)
Dept: INTERNAL MEDICINE | Facility: CLINIC | Age: 50
End: 2025-01-16
Payer: COMMERCIAL

## 2025-01-16 VITALS
HEART RATE: 86 BPM | HEIGHT: 62 IN | WEIGHT: 192 LBS | DIASTOLIC BLOOD PRESSURE: 78 MMHG | TEMPERATURE: 97.7 F | BODY MASS INDEX: 35.33 KG/M2 | OXYGEN SATURATION: 95 % | RESPIRATION RATE: 17 BRPM | SYSTOLIC BLOOD PRESSURE: 134 MMHG

## 2025-01-16 DIAGNOSIS — H92.01 OTALGIA, RIGHT: Primary | ICD-10-CM

## 2025-01-16 RX ORDER — LISINOPRIL 10 MG/1
10 TABLET ORAL DAILY
COMMUNITY
Start: 2024-12-23 | End: 2025-01-16

## 2025-01-16 RX ORDER — DULOXETIN HYDROCHLORIDE 20 MG/1
20 CAPSULE, DELAYED RELEASE ORAL 2 TIMES DAILY
Qty: 180 CAPSULE | Refills: 3 | Status: SHIPPED | OUTPATIENT
Start: 2025-01-16

## 2025-01-16 RX ORDER — LISINOPRIL 5 MG/1
5 TABLET ORAL DAILY
Qty: 90 TABLET | Refills: 3 | Status: SHIPPED | OUTPATIENT
Start: 2025-01-16

## 2025-01-16 ASSESSMENT — PATIENT HEALTH QUESTIONNAIRE - PHQ9
SUM OF ALL RESPONSES TO PHQ QUESTIONS 1-9: 14
SUM OF ALL RESPONSES TO PHQ QUESTIONS 1-9: 14
10. IF YOU CHECKED OFF ANY PROBLEMS, HOW DIFFICULT HAVE THESE PROBLEMS MADE IT FOR YOU TO DO YOUR WORK, TAKE CARE OF THINGS AT HOME, OR GET ALONG WITH OTHER PEOPLE: SOMEWHAT DIFFICULT

## 2025-01-16 ASSESSMENT — PAIN SCALES - GENERAL: PAINLEVEL_OUTOF10: MILD PAIN (2)

## 2025-01-16 NOTE — PROGRESS NOTES
RN ear assessment completed prior to ear irrigation for Right ear. Otoscopic exam reveals cerumen present and irrigation advised. Post Ear Irrigation exam completed and cerumen plug removed, tympanic membrane intact, and no bleeding noted.  Pain assessment completed.     Patient tolerated procedure:  yes    Chyna VAZQUEZ RN

## 2025-01-16 NOTE — PROGRESS NOTES
ASSESSMENT AND PLAN:    1. Otalgia, right  Cerumen impaction, this was disimpacted, no evidence of infection.  Reassured.     Follow up as previously planned.    CHIEF COMPLAINT:  Ear symptoms    HISTORY OF PRESENT ILLNESS:  Jennyfer Terrazas is a 49 year old female with sense of fullness of the right ear, with decreased hearing, and mild tinnitus.  No vertigo.  Has not had sore throat or any other symptoms of URI.     Past Medical History:   Diagnosis Date    Anemia     Anxiety     Depression     Metabolic acidosis     Non-traumatic rhabdomyolysis     Sjogren's disease     Vitamin B12 deficiency     Vitamin D deficiency      History   Smoking Status    Never   Smokeless Tobacco    Never     Family History   Problem Relation Age of Onset    Arthritis Mother     Cancer Mother         brain    Depression Mother     Hearing Loss Mother     Vision Loss Mother     Leukemia Father     Hearing Loss Father     Diabetes Maternal Grandfather     Hypertension Maternal Grandfather     Arthritis Paternal Grandmother     Heart Disease Paternal Grandmother     Hypertension Paternal Grandmother     Kidney Disease Paternal Grandmother     Mental Illness Paternal Grandmother     Alcoholism Paternal Grandfather     Arthritis Paternal Grandfather     Asthma Paternal Grandfather     Clotting Disorder Paternal Grandfather     Dementia Paternal Grandfather     Depression Paternal Grandfather     Asthma Paternal Aunt      Past Surgical History:   Procedure Laterality Date    BRONCHOSCOPY FLEXIBLE AND RIGID Bilateral 01/20/2022    Procedure: BRONCHOSCOPY;  Surgeon: Shoshana Cisneros MD;  Location: [facility] OR    UTERINE FIBROID SURGERY       Allergies   Allergen Reactions    Codeine Other (See Comments)     Tylenol 3, stomach pains that resulted in ER visit    Grass Unknown    Plaquenil [Hydroxychloroquine]      Dizziness and fatigue      VITALS:  Vitals:    01/16/25 1517   BP: 134/78   BP Location: Left arm   Patient Position: Sitting  "  Cuff Size: Adult Regular   Pulse: 86   Resp: 17   Temp: 97.7  F (36.5  C)   TempSrc: Tympanic   SpO2: 95%   Weight: 87.1 kg (192 lb)   Height: 1.575 m (5' 2\")     Estimated body mass index is 35.12 kg/m  as calculated from the following:    Height as of this encounter: 1.575 m (5' 2\").    Weight as of this encounter: 87.1 kg (192 lb).  Wt Readings from Last 3 Encounters:   01/16/25 87.1 kg (192 lb)   03/14/24 86 kg (189 lb 9.6 oz)   05/02/23 83 kg (183 lb)     PHYSICAL EXAM:  Constitutional:  In NAD, alert and oriented  HEENT: right ear cerumen impaction, TM is normal after disimpaction.     Current Outpatient Medications   Medication Sig Dispense Refill    caffeine (FOR VIVARIN) 200 mg Tab [CAFFEINE (FOR VIVARIN) 200 MG TAB] Take 200 mg by mouth.      DULoxetine (CYMBALTA) 20 MG capsule Take 1 capsule (20 mg) by mouth 2 times daily 180 capsule 3    lisinopril (ZESTRIL) 10 MG tablet Take 10 mg by mouth daily.      lisinopril (ZESTRIL) 5 MG tablet Take 5 mg by mouth daily      mv-mn/iron fum/FA/omega3,6,9#3 (WOMEN'S MULTI ORAL) [MV-MN/IRON FUM/FA/OMEGA3,6,9#3 (WOMEN'S MULTI ORAL)] Take 1 tablet by mouth daily.      potassium chloride (KLOR-CON) 20 MEQ packet Take 20 mEq by mouth 2 times daily 180 packet 0    sodium bicarbonate 650 MG tablet [SODIUM BICARBONATE 650 MG TABLET] TAKE 2 TABLETS (1,300 MG) BY MOUTH 2 (TWO) TIMES A DAY. 120 tablet 1     Kris Philip MD  Internal Medicine  [facility]   "

## 2025-08-10 ENCOUNTER — HEALTH MAINTENANCE LETTER (OUTPATIENT)
Age: 50
End: 2025-08-10